# Patient Record
Sex: FEMALE | Race: WHITE | NOT HISPANIC OR LATINO | Employment: FULL TIME | URBAN - METROPOLITAN AREA
[De-identification: names, ages, dates, MRNs, and addresses within clinical notes are randomized per-mention and may not be internally consistent; named-entity substitution may affect disease eponyms.]

---

## 2017-07-19 ENCOUNTER — TRANSCRIBE ORDERS (OUTPATIENT)
Dept: ADMINISTRATIVE | Facility: HOSPITAL | Age: 57
End: 2017-07-19

## 2017-07-19 DIAGNOSIS — Z12.31 VISIT FOR SCREENING MAMMOGRAM: Primary | ICD-10-CM

## 2017-07-26 ENCOUNTER — OFFICE VISIT (OUTPATIENT)
Dept: INTERNAL MEDICINE | Facility: CLINIC | Age: 57
End: 2017-07-26

## 2017-07-26 VITALS
DIASTOLIC BLOOD PRESSURE: 78 MMHG | HEART RATE: 72 BPM | WEIGHT: 178 LBS | TEMPERATURE: 98.6 F | RESPIRATION RATE: 16 BRPM | BODY MASS INDEX: 26.98 KG/M2 | HEIGHT: 68 IN | SYSTOLIC BLOOD PRESSURE: 124 MMHG

## 2017-07-26 DIAGNOSIS — Z13.6 SCREENING FOR CARDIOVASCULAR CONDITION: ICD-10-CM

## 2017-07-26 DIAGNOSIS — E55.9 VITAMIN D DEFICIENCY: ICD-10-CM

## 2017-07-26 DIAGNOSIS — Z00.00 ENCOUNTER FOR HEALTH MAINTENANCE EXAMINATION IN ADULT: Primary | ICD-10-CM

## 2017-07-26 DIAGNOSIS — K21.9 GASTROESOPHAGEAL REFLUX DISEASE WITHOUT ESOPHAGITIS: ICD-10-CM

## 2017-07-26 DIAGNOSIS — R41.840 ATTENTION OR CONCENTRATION DEFICIT: ICD-10-CM

## 2017-07-26 DIAGNOSIS — Z79.899 HIGH RISK MEDICATION USE: ICD-10-CM

## 2017-07-26 DIAGNOSIS — Z01.419 ENCOUNTER FOR CERVICAL PAP SMEAR WITH PELVIC EXAM: ICD-10-CM

## 2017-07-26 DIAGNOSIS — Z11.59 NEED FOR HEPATITIS C SCREENING TEST: ICD-10-CM

## 2017-07-26 DIAGNOSIS — R12 HEARTBURN: ICD-10-CM

## 2017-07-26 DIAGNOSIS — F32.89 OTHER DEPRESSION: ICD-10-CM

## 2017-07-26 DIAGNOSIS — R13.10 DYSPHAGIA, UNSPECIFIED TYPE: ICD-10-CM

## 2017-07-26 DIAGNOSIS — Z23 NEED FOR TETANUS BOOSTER: ICD-10-CM

## 2017-07-26 DIAGNOSIS — M85.80 OSTEOPENIA: ICD-10-CM

## 2017-07-26 PROBLEM — H35.30 MACULAR DEGENERATION: Status: ACTIVE | Noted: 2017-07-26

## 2017-07-26 PROBLEM — F32.A DEPRESSION: Status: ACTIVE | Noted: 2017-07-26

## 2017-07-26 PROBLEM — Z78.0 MENOPAUSE PRESENT: Status: ACTIVE | Noted: 2017-07-26

## 2017-07-26 PROBLEM — C44.91 BASAL CELL CARCINOMA OF SKIN: Status: ACTIVE | Noted: 2017-07-26

## 2017-07-26 PROBLEM — I49.9 IRREGULAR HEART RHYTHM: Status: ACTIVE | Noted: 2017-07-26

## 2017-07-26 PROCEDURE — 90714 TD VACC NO PRESV 7 YRS+ IM: CPT | Performed by: INTERNAL MEDICINE

## 2017-07-26 PROCEDURE — 90471 IMMUNIZATION ADMIN: CPT | Performed by: INTERNAL MEDICINE

## 2017-07-26 PROCEDURE — 99213 OFFICE O/P EST LOW 20 MIN: CPT | Performed by: INTERNAL MEDICINE

## 2017-07-26 PROCEDURE — 99396 PREV VISIT EST AGE 40-64: CPT | Performed by: INTERNAL MEDICINE

## 2017-07-26 RX ORDER — ESCITALOPRAM OXALATE 10 MG/1
10 TABLET ORAL DAILY
Qty: 30 TABLET | Refills: 11 | Status: SHIPPED | OUTPATIENT
Start: 2017-07-26 | End: 2018-06-03 | Stop reason: SDUPTHER

## 2017-07-26 RX ORDER — OMEPRAZOLE 20 MG/1
20 CAPSULE, DELAYED RELEASE ORAL AS NEEDED
COMMUNITY
Start: 2014-11-26

## 2017-07-26 RX ORDER — METHYLPHENIDATE HYDROCHLORIDE 36 MG/1
36 TABLET ORAL DAILY
Qty: 30 TABLET | Refills: 0 | Status: SHIPPED | OUTPATIENT
Start: 2017-07-26 | End: 2017-08-25 | Stop reason: SDUPTHER

## 2017-07-26 RX ORDER — TRAZODONE HYDROCHLORIDE 50 MG/1
50 TABLET ORAL NIGHTLY
COMMUNITY
End: 2017-09-21 | Stop reason: SDUPTHER

## 2017-07-26 NOTE — PROGRESS NOTES
Subjective      History of Present Illness    The patient is re-establishing care.  She was last seen in March 2016.  She moved back from [place] a few weeks ago.    History obtained from the patient.    Gastroesophageal Reflux Disease Follow-up: The patient is being seen for a routine clinic follow-up of Gastroesophageal Reflux disease.   Symptoms:  She gets heartburn daily (feels it in her back).  Has new onset dysphagia.  No abdominal pain, no acid regurgitation, no nausea, no vomiting, no sore throat,  no odynophagia, no hematemesis, and no melena     Associated symptoms:  no anorexia, no early satiety, no bloating, no belching, no weight loss, no hoarseness, no cough and no wheezing.   Current treatment includes Omeprazole daily.     Osteopenia Follow-up: The patient is being seen for routine follow-up of Osteopenia.   The last DEXA scan was done 11/27/13.   Current treatment includes a Multivitamin daily, but no additional Calcium and Vitamin D.  Symptoms:  She reports no inability to stand straight, no loss of balance, no difficulty ambulating, no difficulty with stairs, no difficulty with posture, no change in clothing fit,  no arthralgias, no myalgias, no low back pain, no hip pain, no wrist pain, no neck pain and no new fracture.   The disease type is likely primary/postmenopausal osteoporosis.     ADD (Follow-Up): The patient's ADD  has been well controlled since the last visit.   Interval Events:  The patient states she had to have a full psychiatric evaluation to have her ADD medications prescribed in [place].  The psychiatrist put her on brand name Concerta due to her palpitations with the generics.   Target Symptoms:   1. Hyperactive behavior is denied.   2. Impulsive behavior is denied.   3. Difficulty concentrating is denied.   Medications: Concerta (last dose this am).  Medication side effects include no anorexia, no headache, no tics, no irritability, no problems sleeping, no weight loss, no  abdominal pain, no nausea, and no suicidal ideation.     Depression (Follow-Up): The patient states her Depression has been stable since the last visit.   Comorbid Illnesses:  Insomnia.  Interval Events: She is  again and very happy.   The psychiatrist in [place] started her on Trazodone for sleep, which helps.  Interval Symptoms: Stable depression and insomnia.   Denies loss of interest or pleasure in activities, denies insomnia, denies excessive sleepiness, denies inability to perform normal activities, denies loss of energy, denies feelings of worthlessness, denies feelings of guilt, denies trouble concentrating, and denies anxiety. The patient denies thoughts of suicide.   Social Support: the patient has good social support.   Medications:  Medication(s): Escitalopram.   The patient is adherent with her medication regimen. She denies medication side effects.             Jamee Wagner is a 56 y.o. female who presents for an Annual Physical.      The patient presents for a Pap Smear. She is G [1 ] P [2 ]. She is menopausal. Her last menstrual cycle was at age 47. She has not had any HRT. She [denies] dyspareunia. She [is] sexually active. Her last Pap smear was [ 10/10/13, normal except inflammation ]. She [denies] abnormal Pap Smears in the past. She [denies] sexually-transmitted diseases and HPV in the past. She [denies] KYLIE Exposure in utero.     She [does] do self breast exam. She [denies) breast mass, breast pain, or nipple discharge. Last mammogram was [5/5/16,  cat 2 ]. Last DEXA Scan was [ 11/27/13, osteopenia, hip and spine ]. Last Colonoscopy was [7/25/14, normal ].    She [takes] a daily Multiviatmin and [takes] daily Calcium / Vitamin D.     Her PGM had Colon Cancer.  She [denies] a family history of  Colon Polyps, Breast Cancer, Uterine Cancer, Cervical Cancer, or Ovarian Cancer.        PMH, PSH, SocHx, FamHx, Allergies, and Medications: Reviewed and updated.    Outpatient Medications Prior  to Visit   Medication Sig Dispense Refill   • escitalopram (LEXAPRO) 10 MG tablet Take 1 tablet by mouth daily. 90 tablet 1   • methylphenidate (CONCERTA) 36 MG CR tablet Take 1 tablet by mouth daily. 30 tablet 0     No facility-administered medications prior to visit.      Immunization History   Administered Date(s) Administered   • Pneumococcal Conjugate 13-Valent 11/18/2015   • Tdap 01/22/2007   • Zoster 11/18/2015         Patient Active Problem List   Diagnosis   • Macular degeneration   • Attention or concentration deficit   • Basal cell carcinoma of skin   • Depression   • Gastroesophageal reflux disease   • Menopause present   • Osteopenia   • Vitamin D deficiency       Health Habits:  Dental Exam. up to date  Eye Exam. up to date  Hearing Loss:  No  Exercise: 7 times/week.  Current exercise activities include: walks the dog  Diet: Healthy  Multivitamin: Yes    Safe Driving:  Yes  Seat Belt:  Yes  Bike Helmet:  N/A  Skin Screening:  Yes  Sunscreen: Yes  SBE / HALEIGH: Yes  Sexual Activity:  Yes  Birth Control:  Menopause  STD Prevention:  N/A    Last Pap: 10/10/13, normal except inflammation  Last Mammogram:  5/5/16, cat 2  Last DEXA Scan: 11/27/13, osteopenia, hip and spine  Last Colonoscopy: 7/25/14, normal  Last PSA: N/A    Social:    Social History     Social History   • Marital status:      Spouse name: N/A   • Number of children: 2   • Years of education: N/A     Occupational History   • Teacher, off work right now     Social History Main Topics   • Smoking status: Never Smoker   • Smokeless tobacco: Never Used   • Alcohol use Yes, 2-3 beers per week   • Drug use: No   • Sexual activity: Defer     Other Topics Concern   • Not on file     Social History Narrative   • No narrative on file         Current Medical Providers:    Jami Morales MD (Internal Medicine / Pediatrics)    The [facility] providers who are involved in the care of this patient are listed above.         Review of Systems    Constitutional: Positive for unexpected weight change (up 16 pounds). Negative for chills, fatigue and fever.        No weight gain or weight loss.  No night sweats.  No generalized pain.   HENT: Negative for congestion, ear pain, hearing loss, nosebleeds, postnasal drip, rhinorrhea, sinus pressure, sneezing, sore throat, tinnitus and voice change.         Denies snoring.   Eyes: Negative for photophobia, pain, discharge, redness, itching and visual disturbance.   Respiratory: Negative for cough, chest tightness, shortness of breath, wheezing and stridor.         No orthopnea, dyspnea on exertion, or PND.  No chest congestion.   No  hemoptysis.   Cardiovascular: Negative for chest pain, palpitations and leg swelling.        No claudication or syncope.   Gastrointestinal: Negative for abdominal pain, blood in stool, constipation, diarrhea, nausea, rectal pain and vomiting.        Has heartburn and dysphagia No hematemesis or odynophagia.  No belching or bloating.  No melena.   Endocrine: Negative for cold intolerance, heat intolerance, polydipsia, polyphagia and polyuria.        No hair loss or dry skin.  No generalized weakness.  No hot flashes.   Genitourinary: Negative for difficulty urinating, dyspareunia, dysuria, flank pain, frequency, hematuria, pelvic pain, urgency, vaginal bleeding and vaginal discharge.        No nocturia, incomplete emptying, or incontinence.   Musculoskeletal: Negative for arthralgias, back pain, gait problem, joint swelling, myalgias, neck pain and neck stiffness.        No joint stiffness.   Skin: Negative for rash.        No new skin lesions or changes in skin lesions. No breast pain or masses.  No nipple discharge or nipple inversion.   Neurological: Negative for dizziness, tremors, syncope, speech difficulty, weakness, light-headedness, numbness and headaches.        No tingling.  No memory loss.  No decreased concentration.   Hematological: Negative for adenopathy. Does not  bruise/bleed easily.   Psychiatric/Behavioral: Positive for sleep disturbance. Negative for confusion and suicidal ideas. The patient is not nervous/anxious.         Stable depression.           Objective     Vitals:    07/26/17 1208   BP: 124/78   BP Location: Right arm   Pulse: 72   Resp: 16   Temp: 98.6 °F (37 °C)   TempSrc: Temporal Artery    Weight: 178 lb (80.7 kg)       Body mass index is 27.47 kg/(m^2).    Physical Exam   Constitutional: She appears well-developed and well-nourished.   HENT:   Head: Normocephalic and atraumatic.   Right Ear: Tympanic membrane, external ear and ear canal normal.   Left Ear: Tympanic membrane, external ear and ear canal normal.   Mouth/Throat: Oropharynx is clear and moist. No oropharyngeal exudate.   Eyes: Conjunctivae and EOM are normal. Pupils are equal, round, and reactive to light.   Neck: Normal range of motion. Neck supple. No thyromegaly present.   Cardiovascular: Normal rate, regular rhythm and intact distal pulses.  Exam reveals no gallop and no friction rub.    No murmur heard.  Pulmonary/Chest: Effort normal and breath sounds normal. Right breast exhibits no inverted nipple, no mass, no nipple discharge, no skin change and no tenderness. Left breast exhibits no inverted nipple, no mass, no nipple discharge, no skin change and no tenderness.   Abdominal: Soft. Bowel sounds are normal. She exhibits no distension and no mass. There is no hepatomegaly. There is no tenderness. There is no rebound and no guarding.   Normal rectal tone. No rectal masses.   Genitourinary: Rectum normal, vagina normal and uterus normal. Uterus is not tender. Cervix exhibits no motion tenderness, no discharge and no friability. Right adnexum displays no mass and no tenderness. Left adnexum displays no mass and no tenderness. No vaginal discharge found.   Genitourinary Comments: Cervix is without lesions or erythema.  No uterine or adnexal masses or tenderness to palpation.   Musculoskeletal:  Normal range of motion. She exhibits no edema or tenderness.   Lymphadenopathy:     She has no cervical adenopathy.        Right cervical: No posterior cervical adenopathy present.       Left cervical: No posterior cervical adenopathy present.     She has no axillary adenopathy.        Right: No inguinal and no supraclavicular adenopathy present.        Left: No inguinal and no supraclavicular adenopathy present.   Neurological: She is alert. She has normal strength and normal reflexes. She displays normal reflexes. No cranial nerve deficit. She exhibits normal muscle tone. Coordination and gait normal.   Skin: No lesion and no rash noted.   No atypical skin lesions.   Psychiatric: She has a normal mood and affect.   Nursing note and vitals reviewed.          Assessment/Plan     Diagnoses and all orders for this visit:    Encounter for health maintenance examination in adult  -     Vitamin D 25 Hydroxy; Future  -     TSH; Future  -     CBC & Differential; Future  -     Magnesium; Future  -     Lipid Panel; Future  -     Comprehensive Metabolic Panel; Future  -     Hepatitis C Antibody; Future    Encounter for cervical Pap smear with pelvic exam  -     Liquid-based Pap Smear, Screening    Attention or concentration deficit  -     methylphenidate (CONCERTA) 36 MG CR tablet; Take 1 tablet by mouth Daily.    Gastroesophageal reflux disease without esophagitis  -     Ambulatory referral for Screening EGD    Heartburn  -     Ambulatory referral for Screening EGD    Dysphagia, unspecified type  -     Ambulatory referral for Screening EGD    Osteopenia  -     DEXA Bone Density Axial; Future    Vitamin D deficiency  -     Vitamin D 25 Hydroxy; Future    Other depression  -     escitalopram (LEXAPRO) 10 MG tablet; Take 1 tablet by mouth Daily.    Screening for cardiovascular condition  -     Vitamin D 25 Hydroxy; Future  -     TSH; Future  -     CBC & Differential; Future  -     Magnesium; Future  -     Lipid Panel; Future  -      Comprehensive Metabolic Panel; Future  -     Hepatitis C Antibody; Future    High risk medication use  -     Cancel: Urine Drug Screen  -     Magnesium; Future    Need for tetanus booster  -     Td (adult) Vaccine Not Adsorbed    Need for hepatitis C screening test  -     Hepatitis C Antibody; Future      The patient agrees to return for fasting labs.      Return in about 3 months (around 10/26/2017) for Recheck-ADD nonfasting.

## 2017-07-26 NOTE — PATIENT INSTRUCTIONS
The patient agrees to return for fasting labs.    Menopause is a normal process in which your reproductive ability comes to an end. This process happens gradually over a span of months to years, usually between the ages of 48 and 55. Menopause is complete when you have missed 12 consecutive menstrual periods.  It is important to talk with your health care provider about some of the most common conditions that affect postmenopausal women, such as heart disease, cancer, and bone loss (osteoporosis). Adopting a healthy lifestyle and getting preventive care can help to promote your health and wellness. Those actions can also lower your chances of developing some of these common conditions.  WHAT SHOULD I KNOW ABOUT MENOPAUSE?  During menopause, you may experience a number of symptoms, such as:  · Moderate-to-severe hot flashes.  · Night sweats.  · Decrease in sex drive.  · Mood swings.  · Headaches.  · Tiredness.  · Irritability.  · Memory problems.  · Insomnia.  Choosing to treat or not to treat menopausal changes is an individual decision that you make with your health care provider.  WHAT SHOULD I KNOW ABOUT HORMONE REPLACEMENT THERAPY AND SUPPLEMENTS?  Hormone therapy products are effective for treating symptoms that are associated with menopause, such as hot flashes and night sweats. Hormone replacement carries certain risks, especially as you become older. If you are thinking about using estrogen or estrogen with progestin treatments, discuss the benefits and risks with your health care provider.  WHAT SHOULD I KNOW ABOUT HEART DISEASE AND STROKE?  Heart disease, heart attack, and stroke become more likely as you age. This may be due, in part, to the hormonal changes that your body experiences during menopause. These can affect how your body processes dietary fats, triglycerides, and cholesterol. Heart attack and stroke are both medical emergencies.  There are many things that you can do to help prevent heart  disease and stroke:  · Have your blood pressure checked at least every 1-2 years. High blood pressure causes heart disease and increases the risk of stroke.  · If you are 55-79 years old, ask your health care provider if you should take aspirin to prevent a heart attack or a stroke.  · Do not use any tobacco products, including cigarettes, chewing tobacco, or electronic cigarettes. If you need help quitting, ask your health care provider.  · It is important to eat a healthy diet and maintain a healthy weight.    Be sure to include plenty of vegetables, fruits, low-fat dairy products, and lean protein.    Avoid eating foods that are high in solid fats, added sugars, or salt (sodium).  · Get regular exercise. This is one of the most important things that you can do for your health.    Try to exercise for at least 150 minutes each week. The type of exercise that you do should increase your heart rate and make you sweat. This is known as moderate-intensity exercise.    Try to do strengthening exercises at least twice each week. Do these in addition to the moderate-intensity exercise.  · Know your numbers. Ask your health care provider to check your cholesterol and your blood glucose. Continue to have your blood tested as directed by your health care provider.  WHAT SHOULD I KNOW ABOUT CANCER SCREENING?  There are several types of cancer. Take the following steps to reduce your risk and to catch any cancer development as early as possible.  Breast Cancer  · Practice breast self-awareness.    This means understanding how your breasts normally appear and feel.    It also means doing regular breast self-exams. Let your health care provider know about any changes, no matter how small.  · If you are 40 or older, have a clinician do a breast exam (clinical breast exam or CBE) every year. Depending on your age, family history, and medical history, it may be recommended that you also have a yearly breast X-ray (mammogram).  · If  you have a family history of breast cancer, talk with your health care provider about genetic screening.  · If you are at high risk for breast cancer, talk with your health care provider about having an MRI and a mammogram every year.  · Breast cancer (BRCA) gene test is recommended for women who have family members with BRCA-related cancers. Results of the assessment will determine the need for genetic counseling and BRCA1 and for BRCA2 testing. BRCA-related cancers include these types:    Breast. This occurs in males or females.    Ovarian.    Tubal. This may also be called fallopian tube cancer.    Cancer of the abdominal or pelvic lining (peritoneal cancer).    Prostate.    Pancreatic.  Cervical, Uterine, and Ovarian Cancer  Your health care provider may recommend that you be screened regularly for cancer of the pelvic organs. These include your ovaries, uterus, and vagina. This screening involves a pelvic exam, which includes checking for microscopic changes to the surface of your cervix (Pap test).  · For women ages 21-65, health care providers may recommend a pelvic exam and a Pap test every three years. For women ages 30-65, they may recommend the Pap test and pelvic exam, combined with testing for human papilloma virus (HPV), every five years. Some types of HPV increase your risk of cervical cancer. Testing for HPV may also be done on women of any age who have unclear Pap test results.  · Other health care providers may not recommend any screening for nonpregnant women who are considered low risk for pelvic cancer and have no symptoms. Ask your health care provider if a screening pelvic exam is right for you.  · If you have had past treatment for cervical cancer or a condition that could lead to cancer, you need Pap tests and screening for cancer for at least 20 years after your treatment. If Pap tests have been discontinued for you, your risk factors (such as having a new sexual partner) need to be  reassessed to determine if you should start having screenings again. Some women have medical problems that increase the chance of getting cervical cancer. In these cases, your health care provider may recommend that you have screening and Pap tests more often.  · If you have a family history of uterine cancer or ovarian cancer, talk with your health care provider about genetic screening.  · If you have vaginal bleeding after reaching menopause, tell your health care provider.  · There are currently no reliable tests available to screen for ovarian cancer.  Lung Cancer  Lung cancer screening is recommended for adults 55-80 years old who are at high risk for lung cancer because of a history of smoking. A yearly low-dose CT scan of the lungs is recommended if you:  · Currently smoke.  · Have a history of at least 30 pack-years of smoking and you currently smoke or have quit within the past 15 years. A pack-year is smoking an average of one pack of cigarettes per day for one year.  Yearly screening should:  · Continue until it has been 15 years since you quit.  · Stop if you develop a health problem that would prevent you from having lung cancer treatment.  Colorectal Cancer  · This type of cancer can be detected and can often be prevented.  · Routine colorectal cancer screening usually begins at age 50 and continues through age 75.  · If you have risk factors for colon cancer, your health care provider may recommend that you be screened at an earlier age.  · If you have a family history of colorectal cancer, talk with your health care provider about genetic screening.  · Your health care provider may also recommend using home test kits to check for hidden blood in your stool.  · A small camera at the end of a tube can be used to examine your colon directly (sigmoidoscopy or colonoscopy). This is done to check for the earliest forms of colorectal cancer.  · Direct examination of the colon should be repeated every 5-10  years until age 75. However, if early forms of precancerous polyps or small growths are found or if you have a family history or genetic risk for colorectal cancer, you may need to be screened more often.  Skin Cancer  · Check your skin from head to toe regularly.  · Monitor any moles. Be sure to tell your health care provider:    About any new moles or changes in moles, especially if there is a change in a mole's shape or color.    If you have a mole that is larger than the size of a pencil eraser.  · If any of your family members has a history of skin cancer, especially at a young age, talk with your health care provider about genetic screening.  · Always use sunscreen. Apply sunscreen liberally and repeatedly throughout the day.  · Whenever you are outside, protect yourself by wearing long sleeves, pants, a wide-brimmed hat, and sunglasses.  WHAT SHOULD I KNOW ABOUT OSTEOPOROSIS?  Osteoporosis is a condition in which bone destruction happens more quickly than new bone creation. After menopause, you may be at an increased risk for osteoporosis. To help prevent osteoporosis or the bone fractures that can happen because of osteoporosis, the following is recommended:  · If you are 19-50 years old, get at least 1,000 mg of calcium and at least 600 mg of vitamin D per day.  · If you are older than age 50 but younger than age 70, get at least 1,200 mg of calcium and at least 600 mg of vitamin D per day.  · If you are older than age 70, get at least 1,200 mg of calcium and at least 800 mg of vitamin D per day.  Smoking and excessive alcohol intake increase the risk of osteoporosis. Eat foods that are rich in calcium and vitamin D, and do weight-bearing exercises several times each week as directed by your health care provider.  WHAT SHOULD I KNOW ABOUT HOW MENOPAUSE AFFECTS MY MENTAL HEALTH?  Depression may occur at any age, but it is more common as you become older. Common symptoms of depression include:  · Low or sad  mood.  · Changes in sleep patterns.  · Changes in appetite or eating patterns.  · Feeling an overall lack of motivation or enjoyment of activities that you previously enjoyed.  · Frequent crying spells.  Talk with your health care provider if you think that you are experiencing depression.  WHAT SHOULD I KNOW ABOUT IMMUNIZATIONS?  It is important that you get and maintain your immunizations. These include:  · Tetanus, diphtheria, and pertussis (Tdap) booster vaccine.  · Influenza every year before the flu season begins.  · Pneumonia vaccine.  · Shingles vaccine.  Your health care provider may also recommend other immunizations.     This information is not intended to replace advice given to you by your health care provider. Make sure you discuss any questions you have with your health care provider.     Document Released: 02/09/2007 Document Revised: 01/08/2016 Document Reviewed: 08/20/2015  [employer] Interactive Patient Education ©2017 [employer]

## 2017-07-28 PROBLEM — I49.9 IRREGULAR HEART RHYTHM: Status: RESOLVED | Noted: 2017-07-26 | Resolved: 2017-07-28

## 2017-08-01 ENCOUNTER — LAB (OUTPATIENT)
Dept: INTERNAL MEDICINE | Facility: CLINIC | Age: 57
End: 2017-08-01

## 2017-08-01 DIAGNOSIS — Z13.6 SCREENING FOR CARDIOVASCULAR CONDITION: ICD-10-CM

## 2017-08-01 DIAGNOSIS — Z00.00 ENCOUNTER FOR HEALTH MAINTENANCE EXAMINATION IN ADULT: ICD-10-CM

## 2017-08-01 DIAGNOSIS — E55.9 VITAMIN D DEFICIENCY: ICD-10-CM

## 2017-08-01 DIAGNOSIS — Z79.899 HIGH RISK MEDICATION USE: ICD-10-CM

## 2017-08-01 DIAGNOSIS — Z11.59 NEED FOR HEPATITIS C SCREENING TEST: ICD-10-CM

## 2017-08-01 LAB
25(OH)D3 SERPL-MCNC: 29.6 NG/ML
ALBUMIN SERPL-MCNC: 4.5 G/DL (ref 3.2–4.8)
ALBUMIN/GLOB SERPL: 2 G/DL (ref 1.5–2.5)
ALP SERPL-CCNC: 95 U/L (ref 25–100)
ALT SERPL W P-5'-P-CCNC: 19 U/L (ref 7–40)
ANION GAP SERPL CALCULATED.3IONS-SCNC: 9 MMOL/L (ref 3–11)
ARTICHOKE IGE QN: 109 MG/DL (ref 0–130)
AST SERPL-CCNC: 17 U/L (ref 0–33)
BASOPHILS # BLD AUTO: 0.06 10*3/MM3 (ref 0–0.2)
BASOPHILS NFR BLD AUTO: 1.2 % (ref 0–1)
BILIRUB SERPL-MCNC: 0.6 MG/DL (ref 0.3–1.2)
BUN BLD-MCNC: 19 MG/DL (ref 9–23)
BUN/CREAT SERPL: 23.8 (ref 7–25)
CALCIUM SPEC-SCNC: 9.5 MG/DL (ref 8.7–10.4)
CHLORIDE SERPL-SCNC: 105 MMOL/L (ref 99–109)
CHOLEST SERPL-MCNC: 194 MG/DL (ref 0–200)
CO2 SERPL-SCNC: 25 MMOL/L (ref 20–31)
CREAT BLD-MCNC: 0.8 MG/DL (ref 0.6–1.3)
DEPRECATED RDW RBC AUTO: 42.4 FL (ref 37–54)
EOSINOPHIL # BLD AUTO: 0.11 10*3/MM3 (ref 0–0.3)
EOSINOPHIL NFR BLD AUTO: 2.2 % (ref 0–3)
ERYTHROCYTE [DISTWIDTH] IN BLOOD BY AUTOMATED COUNT: 11.9 % (ref 11.3–14.5)
GFR SERPL CREATININE-BSD FRML MDRD: 75 ML/MIN/1.73
GLOBULIN UR ELPH-MCNC: 2.2 GM/DL
GLUCOSE BLD-MCNC: 101 MG/DL (ref 70–100)
HCT VFR BLD AUTO: 40.9 % (ref 34.5–44)
HCV AB SER DONR QL: NORMAL
HDLC SERPL-MCNC: 70 MG/DL (ref 40–60)
HGB BLD-MCNC: 14.1 G/DL (ref 11.5–15.5)
IMM GRANULOCYTES # BLD: 0 10*3/MM3 (ref 0–0.03)
IMM GRANULOCYTES NFR BLD: 0 % (ref 0–0.6)
LYMPHOCYTES # BLD AUTO: 2.26 10*3/MM3 (ref 0.6–4.8)
LYMPHOCYTES NFR BLD AUTO: 45.9 % (ref 24–44)
MAGNESIUM SERPL-MCNC: 2.2 MG/DL (ref 1.3–2.7)
MCH RBC QN AUTO: 33.7 PG (ref 27–31)
MCHC RBC AUTO-ENTMCNC: 34.5 G/DL (ref 32–36)
MCV RBC AUTO: 97.6 FL (ref 80–99)
MONOCYTES # BLD AUTO: 0.37 10*3/MM3 (ref 0–1)
MONOCYTES NFR BLD AUTO: 7.5 % (ref 0–12)
NEUTROPHILS # BLD AUTO: 2.12 10*3/MM3 (ref 1.5–8.3)
NEUTROPHILS NFR BLD AUTO: 43.2 % (ref 41–71)
PLATELET # BLD AUTO: 268 10*3/MM3 (ref 150–450)
PMV BLD AUTO: 10.2 FL (ref 6–12)
POTASSIUM BLD-SCNC: 4.2 MMOL/L (ref 3.5–5.5)
PROT SERPL-MCNC: 6.7 G/DL (ref 5.7–8.2)
RBC # BLD AUTO: 4.19 10*6/MM3 (ref 3.89–5.14)
SODIUM BLD-SCNC: 139 MMOL/L (ref 132–146)
TRIGL SERPL-MCNC: 114 MG/DL (ref 0–150)
TSH SERPL DL<=0.05 MIU/L-ACNC: 1.33 MIU/ML (ref 0.35–5.35)
WBC NRBC COR # BLD: 4.92 10*3/MM3 (ref 3.5–10.8)

## 2017-08-01 PROCEDURE — 82306 VITAMIN D 25 HYDROXY: CPT | Performed by: INTERNAL MEDICINE

## 2017-08-01 PROCEDURE — 80053 COMPREHEN METABOLIC PANEL: CPT | Performed by: INTERNAL MEDICINE

## 2017-08-01 PROCEDURE — 36415 COLL VENOUS BLD VENIPUNCTURE: CPT | Performed by: INTERNAL MEDICINE

## 2017-08-01 PROCEDURE — 85025 COMPLETE CBC W/AUTO DIFF WBC: CPT | Performed by: INTERNAL MEDICINE

## 2017-08-01 PROCEDURE — 84443 ASSAY THYROID STIM HORMONE: CPT | Performed by: INTERNAL MEDICINE

## 2017-08-01 PROCEDURE — 83735 ASSAY OF MAGNESIUM: CPT | Performed by: INTERNAL MEDICINE

## 2017-08-01 PROCEDURE — 86803 HEPATITIS C AB TEST: CPT | Performed by: INTERNAL MEDICINE

## 2017-08-01 PROCEDURE — 80061 LIPID PANEL: CPT | Performed by: INTERNAL MEDICINE

## 2017-08-08 ENCOUNTER — APPOINTMENT (OUTPATIENT)
Dept: MAMMOGRAPHY | Facility: HOSPITAL | Age: 57
End: 2017-08-08
Attending: INTERNAL MEDICINE

## 2017-08-17 ENCOUNTER — HOSPITAL ENCOUNTER (OUTPATIENT)
Dept: BONE DENSITY | Facility: HOSPITAL | Age: 57
Discharge: HOME | End: 2017-08-17
Attending: INTERNAL MEDICINE | Admitting: INTERNAL MEDICINE

## 2017-08-17 ENCOUNTER — HOSPITAL ENCOUNTER (OUTPATIENT)
Dept: MAMMOGRAPHY | Facility: HOSPITAL | Age: 57
Discharge: HOME | End: 2017-08-17
Attending: INTERNAL MEDICINE

## 2017-08-17 DIAGNOSIS — M85.80 OSTEOPENIA: ICD-10-CM

## 2017-08-17 DIAGNOSIS — Z12.31 VISIT FOR SCREENING MAMMOGRAM: ICD-10-CM

## 2017-08-17 PROCEDURE — 77063 BREAST TOMOSYNTHESIS BI: CPT

## 2017-08-17 PROCEDURE — 77080 DXA BONE DENSITY AXIAL: CPT

## 2017-08-17 PROCEDURE — 77063 BREAST TOMOSYNTHESIS BI: CPT | Performed by: RADIOLOGY

## 2017-08-17 PROCEDURE — G0202 SCR MAMMO BI INCL CAD: HCPCS

## 2017-08-17 PROCEDURE — 77067 SCR MAMMO BI INCL CAD: CPT | Performed by: RADIOLOGY

## 2017-08-25 ENCOUNTER — TELEPHONE (OUTPATIENT)
Dept: INTERNAL MEDICINE | Facility: CLINIC | Age: 57
End: 2017-08-25

## 2017-08-25 DIAGNOSIS — R41.840 ATTENTION OR CONCENTRATION DEFICIT: ICD-10-CM

## 2017-08-25 RX ORDER — METHYLPHENIDATE HYDROCHLORIDE 36 MG/1
36 TABLET ORAL DAILY
Qty: 30 TABLET | Refills: 0 | Status: SHIPPED | OUTPATIENT
Start: 2017-08-25 | End: 2017-09-28 | Stop reason: SDUPTHER

## 2017-08-25 NOTE — TELEPHONE ENCOUNTER
----- Message from Jessica Stiles sent at 8/25/2017  9:48 AM EDT -----  Contact: PATIENT  PATIENT NEEDS A REFILL ON CONCERTA. A GOOD CALL BACK NUMBER -[phone]. THANK YOU.

## 2017-09-11 ENCOUNTER — TELEPHONE (OUTPATIENT)
Dept: INTERNAL MEDICINE | Facility: CLINIC | Age: 57
End: 2017-09-11

## 2017-09-11 NOTE — TELEPHONE ENCOUNTER
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  The school does not have their own form to fill out.   She would like the letter mailed to her.

## 2017-09-11 NOTE — TELEPHONE ENCOUNTER
----- Message from Beth Bates sent at 9/11/2017 11:25 AM EDT -----  Patient called in regards to questions about her physical. Patient has requested to talk one on one with doctor, or even a nurse.     Call back number for patient- [phone]    Thank you.

## 2017-09-21 ENCOUNTER — TELEPHONE (OUTPATIENT)
Dept: INTERNAL MEDICINE | Facility: CLINIC | Age: 57
End: 2017-09-21

## 2017-09-21 RX ORDER — TRAZODONE HYDROCHLORIDE 50 MG/1
50 TABLET ORAL NIGHTLY
Qty: 60 TABLET | Refills: 11 | Status: SHIPPED | OUTPATIENT
Start: 2017-09-21 | End: 2018-10-12 | Stop reason: SDUPTHER

## 2017-09-21 NOTE — TELEPHONE ENCOUNTER
----- Message from Beth Bates sent at 9/21/2017  2:56 PM EDT -----  Patient called in regards to a prescription refill. Prescription needed is Trazodone 50MG     Pharmacy: [facility] on [address]     Patient call back: [phone]

## 2017-09-28 ENCOUNTER — TELEPHONE (OUTPATIENT)
Dept: INTERNAL MEDICINE | Facility: CLINIC | Age: 57
End: 2017-09-28

## 2017-09-28 DIAGNOSIS — R41.840 ATTENTION OR CONCENTRATION DEFICIT: ICD-10-CM

## 2017-09-28 RX ORDER — METHYLPHENIDATE HYDROCHLORIDE 36 MG/1
36 TABLET ORAL DAILY
Qty: 30 TABLET | Refills: 0 | Status: SHIPPED | OUTPATIENT
Start: 2017-09-28 | End: 2017-10-30 | Stop reason: SDUPTHER

## 2017-09-28 NOTE — TELEPHONE ENCOUNTER
----- Message from Ashley Klein sent at 9/28/2017  9:17 AM EDT -----    PT CALLED NEEDING A REFILL ON RX CONCERTA    SHE CAN BE REACHED [phone]

## 2017-10-30 ENCOUNTER — OFFICE VISIT (OUTPATIENT)
Dept: INTERNAL MEDICINE | Facility: CLINIC | Age: 57
End: 2017-10-30

## 2017-10-30 VITALS
SYSTOLIC BLOOD PRESSURE: 126 MMHG | DIASTOLIC BLOOD PRESSURE: 80 MMHG | HEART RATE: 76 BPM | WEIGHT: 178 LBS | BODY MASS INDEX: 27.06 KG/M2 | RESPIRATION RATE: 16 BRPM

## 2017-10-30 DIAGNOSIS — Z00.00 ROUTINE ADULT HEALTH MAINTENANCE: Primary | ICD-10-CM

## 2017-10-30 DIAGNOSIS — R41.840 ATTENTION OR CONCENTRATION DEFICIT: ICD-10-CM

## 2017-10-30 DIAGNOSIS — Z23 NEED FOR INFLUENZA VACCINATION: ICD-10-CM

## 2017-10-30 LAB
BILIRUB BLD-MCNC: NEGATIVE MG/DL
CLARITY, POC: CLEAR
COLOR UR: YELLOW
EXPIRATION DATE: NORMAL
GLUCOSE UR STRIP-MCNC: NEGATIVE MG/DL
KETONES UR QL: NEGATIVE
LEUKOCYTE EST, POC: NEGATIVE
Lab: NORMAL
NITRITE UR-MCNC: NEGATIVE MG/ML
PH UR: 6.5 [PH] (ref 5–8)
PROT UR STRIP-MCNC: NEGATIVE MG/DL
RBC # UR STRIP: NEGATIVE /UL
SP GR UR: 1.01 (ref 1–1.03)
UROBILINOGEN UR QL: NORMAL

## 2017-10-30 PROCEDURE — 81003 URINALYSIS AUTO W/O SCOPE: CPT | Performed by: INTERNAL MEDICINE

## 2017-10-30 PROCEDURE — 90471 IMMUNIZATION ADMIN: CPT | Performed by: INTERNAL MEDICINE

## 2017-10-30 PROCEDURE — 90686 IIV4 VACC NO PRSV 0.5 ML IM: CPT | Performed by: INTERNAL MEDICINE

## 2017-10-30 PROCEDURE — 99213 OFFICE O/P EST LOW 20 MIN: CPT | Performed by: INTERNAL MEDICINE

## 2017-10-30 RX ORDER — METHYLPHENIDATE HYDROCHLORIDE 36 MG/1
36 TABLET ORAL DAILY
Qty: 30 TABLET | Refills: 0 | Status: SHIPPED | OUTPATIENT
Start: 2017-10-30 | End: 2017-12-06 | Stop reason: SDUPTHER

## 2017-10-30 NOTE — PROGRESS NOTES
Subjective       Jamee Wagner is a 56 y.o. female.     Chief Complaint   Patient presents with   • ADD       History obtained from the patient.      History of Present Illness        ADD (Follow-Up): The patient's ADD  has been well controlled since the last visit.   Interval Events:  None.  Target Symptoms:   1. Hyperactive behavior is denied.   2. Impulsive behavior is denied.   3. Difficulty concentrating is denied.   Medications: Concerta   Medication side effects include no anorexia, no headache, no tics, no irritability, no problems sleeping, no weight loss, no abdominal pain, no nausea, and no suicidal ideation.     Current Outpatient Prescriptions on File Prior to Visit   Medication Sig Dispense Refill   • escitalopram (LEXAPRO) 10 MG tablet Take 1 tablet by mouth Daily. 30 tablet 11   • methylphenidate (CONCERTA) 36 MG CR tablet Take 1 tablet by mouth Daily. 30 tablet 0   • omeprazole (PRILOSEC) 20 MG capsule Take 20 mg by mouth As Needed.     • traZODone (DESYREL) 50 MG tablet Take 1 tablet by mouth Every Night. Take 1-2 tablets at night as needed for sleep 60 tablet 11     No current facility-administered medications on file prior to visit.          The following portions of the patient's history were reviewed and updated as appropriate: allergies, current medications, past family history, past medical history, past social history, past surgical history and problem list.    Review of Systems   Constitutional: Negative for appetite change, fatigue and unexpected weight change.   Respiratory: Negative for shortness of breath.    Cardiovascular: Negative for chest pain and palpitations.   Gastrointestinal: Negative for abdominal pain, diarrhea, nausea and vomiting.   Neurological: Negative for headaches.        No tics.  No memory loss.   Psychiatric/Behavioral: Negative for agitation, confusion, decreased concentration, sleep disturbance and suicidal ideas. The patient is not nervous/anxious.          No depression.         Objective       Blood pressure 126/80, pulse 76, resp. rate 16, weight 178 lb (80.7 kg).      Physical Exam   Constitutional: She appears well-developed and well-nourished.   Cardiovascular: Normal rate and normal heart sounds.    No murmur heard.  Pulmonary/Chest: Effort normal and breath sounds normal.   Neurological: She is alert.   Psychiatric: She has a normal mood and affect.   Nursing note and vitals reviewed.      Assessment / Plan:    Jamee was seen today for add.    Diagnoses and all orders for this visit:    Need for influenza vaccination  -     Flu Vaccine Quad PF 3YR+ (FLUARIX 4045-0298)    Attention or concentration deficit  -     methylphenidate (CONCERTA) 36 MG CR tablet; Take 1 tablet by mouth Daily.    Routine adult health maintenance  -     POCT urinalysis dipstick, automated    The patient was instructed in the side effects of the medication.  Risks of the potential for tolerance, dependence, and addiction were discussed.  The patient was instructed to take the lowest dosage of the medication, at the lowest frequency, and for the shortest period of time possible.  The patient was instructed not to receive controlled substances or narcotics from other doctors, and not to giveaway or sell the medication.    The patient was instructed to abstain from illicit drug use.  The patient was instructed to avoid alcohol while taking these medications.      Narcotics/controlled substance agreement, Long report, and Urine Drug Screen were updated today if needed.      Return in about 3 months (around 1/30/2018) for Recheck-ADD.

## 2017-12-06 ENCOUNTER — TELEPHONE (OUTPATIENT)
Dept: INTERNAL MEDICINE | Facility: CLINIC | Age: 57
End: 2017-12-06

## 2017-12-06 DIAGNOSIS — R41.840 ATTENTION OR CONCENTRATION DEFICIT: ICD-10-CM

## 2017-12-06 RX ORDER — METHYLPHENIDATE HYDROCHLORIDE 36 MG/1
36 TABLET ORAL DAILY
Qty: 30 TABLET | Refills: 0 | Status: SHIPPED | OUTPATIENT
Start: 2017-12-06 | End: 2018-01-08 | Stop reason: SDUPTHER

## 2017-12-06 NOTE — TELEPHONE ENCOUNTER
----- Message from Fadumo Freeman sent at 12/6/2017 11:29 AM EST -----  [phone]  REFILL ON [employer]  CALL PT WHEN READY FOR

## 2018-01-08 ENCOUNTER — TELEPHONE (OUTPATIENT)
Dept: INTERNAL MEDICINE | Facility: CLINIC | Age: 58
End: 2018-01-08

## 2018-01-08 DIAGNOSIS — R41.840 ATTENTION OR CONCENTRATION DEFICIT: ICD-10-CM

## 2018-01-08 RX ORDER — METHYLPHENIDATE HYDROCHLORIDE 36 MG/1
36 TABLET ORAL DAILY
Qty: 30 TABLET | Refills: 0 | Status: SHIPPED | OUTPATIENT
Start: 2018-01-08 | End: 2018-01-29 | Stop reason: SDUPTHER

## 2018-01-08 NOTE — TELEPHONE ENCOUNTER
----- Message from Beth Bates sent at 1/8/2018  8:20 AM EST -----  Patient called requesting a prescription refill pickup.     RX needed: Concerta    Call back for patient: [phone]

## 2018-01-29 ENCOUNTER — OFFICE VISIT (OUTPATIENT)
Dept: INTERNAL MEDICINE | Facility: CLINIC | Age: 58
End: 2018-01-29

## 2018-01-29 VITALS
TEMPERATURE: 97 F | HEART RATE: 72 BPM | WEIGHT: 177.38 LBS | DIASTOLIC BLOOD PRESSURE: 70 MMHG | BODY MASS INDEX: 26.97 KG/M2 | SYSTOLIC BLOOD PRESSURE: 140 MMHG | RESPIRATION RATE: 20 BRPM

## 2018-01-29 DIAGNOSIS — J06.9 UPPER RESPIRATORY TRACT INFECTION, UNSPECIFIED TYPE: ICD-10-CM

## 2018-01-29 DIAGNOSIS — R41.840 ATTENTION OR CONCENTRATION DEFICIT: Primary | ICD-10-CM

## 2018-01-29 DIAGNOSIS — H61.23 BILATERAL IMPACTED CERUMEN: ICD-10-CM

## 2018-01-29 PROCEDURE — 99214 OFFICE O/P EST MOD 30 MIN: CPT | Performed by: INTERNAL MEDICINE

## 2018-01-29 PROCEDURE — 69210 REMOVE IMPACTED EAR WAX UNI: CPT | Performed by: INTERNAL MEDICINE

## 2018-01-29 RX ORDER — METHYLPHENIDATE HYDROCHLORIDE 36 MG/1
36 TABLET ORAL DAILY
Qty: 30 TABLET | Refills: 0 | Status: SHIPPED | OUTPATIENT
Start: 2018-01-29 | End: 2018-03-06 | Stop reason: SDUPTHER

## 2018-01-29 NOTE — PROGRESS NOTES
Subjective       Jamee Wagner is a 57 y.o. female.     Chief Complaint   Patient presents with   • ADD     3 month follow up         History obtained from the patient.    ADD Follow-Up: The patient's ADD  has been well controlled since the last visit.   Interval Events:  None.  Target Symptoms:   1. Hyperactive behavior is denied.   2. Impulsive behavior is denied.   3. Difficulty concentrating is denied.   Medications: Concerta   Medication side effects include no anorexia, no headache, no tics, no irritability, no problems sleeping, no weight loss, no abdominal pain, no nausea, and no suicidal ideation.           URI    This is a new problem. Episode onset: 2-3 days ago. The problem has been unchanged. There has been no fever. Associated symptoms include congestion, coughing (dry) and ear pain (left). Pertinent negatives include no abdominal pain, chest pain, diarrhea, headaches, joint pain, joint swelling, nausea, neck pain, rash, rhinorrhea, sinus pain, sneezing, sore throat, swollen glands, vomiting or wheezing. Treatments tried: Benadryl. The treatment provided mild relief.        Current Outpatient Prescriptions on File Prior to Visit   Medication Sig Dispense Refill   • escitalopram (LEXAPRO) 10 MG tablet Take 1 tablet by mouth Daily. 30 tablet 11   • methylphenidate (CONCERTA) 36 MG CR tablet Take 1 tablet by mouth Daily 30 tablet 0   • omeprazole (PRILOSEC) 20 MG capsule Take 20 mg by mouth As Needed.     • traZODone (DESYREL) 50 MG tablet Take 1 tablet by mouth Every Night. Take 1-2 tablets at night as needed for sleep 60 tablet 11     No current facility-administered medications on file prior to visit.          The following portions of the patient's history were reviewed and updated as appropriate: allergies, current medications, past family history, past medical history, past social history, past surgical history and problem list.    Review of Systems   Constitutional: Negative for appetite  change, chills, fatigue and unexpected weight change.   HENT: Positive for congestion, ear pain (left) and postnasal drip. Negative for rhinorrhea, sinus pain, sinus pressure, sneezing, sore throat and voice change.    Eyes: Positive for discharge (last week). Negative for pain, redness and itching.   Respiratory: Positive for cough (dry). Negative for shortness of breath and wheezing.    Cardiovascular: Negative for chest pain and palpitations.   Gastrointestinal: Negative for abdominal pain, diarrhea, nausea and vomiting.   Musculoskeletal: Negative for arthralgias, joint pain, joint swelling, myalgias and neck pain.   Skin: Negative for rash.   Neurological: Negative for headaches.        No tics.  No memory loss.   Hematological: Negative for adenopathy.   Psychiatric/Behavioral: Negative for agitation, confusion, decreased concentration, sleep disturbance and suicidal ideas. The patient is not nervous/anxious.         No depression.         Objective       Blood pressure 140/70, pulse 72, temperature 97 °F (36.1 °C), temperature source Temporal Artery , resp. rate 20, weight 80.5 kg (177 lb 6 oz).      Physical Exam   Constitutional: She appears well-developed and well-nourished.   HENT:   Head: Normocephalic and atraumatic.   Right Ear: External ear normal.   Left Ear: External ear normal.   Mouth/Throat: Oropharynx is clear and moist and mucous membranes are normal. No oral lesions.   Tonsils normal.  No sinus tenderness to palpation.  Left ear completely occluded by cerumen and right ear partially occluded by cerumen.  After removal, TM's clear.   Eyes: Conjunctivae are normal.   Neck: Normal range of motion. Neck supple.   Cardiovascular: Normal rate and regular rhythm.    No murmur heard.  Pulmonary/Chest: Effort normal and breath sounds normal.   Lymphadenopathy:     She has no cervical adenopathy.   Skin: No rash noted.   Psychiatric: She has a normal mood and affect.   Nursing note and vitals  reviewed.     Counseling was given to patient for the following topics: instructions for management, patient and family education, impressions, risks and benefits of treatment options and importance of treatment compliance . Total time of the encounter was 20 minutes and 15 minutes was spend counseling.      Ear Cerumen Removal Instrumentation  Date/Time: 1/29/2018 6:01 PM  Performed by: MORGAN LOPEZ  Authorized by: MORGAN LOPEZ   Consent: Verbal consent obtained.  Risks and benefits: risks, benefits and alternatives were discussed  Consent given by: patient    Anesthesia:  Local Anesthetic: none  Ceruminolytics applied: Ceruminolytics applied prior to the procedure.  Location details: right ear and left ear  Procedure type comments: curette and irrigation    Sedation:  Patient sedated: no  Patient tolerance: Patient tolerated the procedure well with no immediate complications          Assessment / Plan:    Diagnoses and all orders for this visit:    Attention or concentration deficit  -     methylphenidate (CONCERTA) 36 MG CR tablet; Take 1 tablet by mouth Daily    Upper respiratory tract infection, unspecified type   Continue Benadryl as needed.    Bilateral impacted cerumen   -     Ear Cerumen Removal      The patient was instructed in the side effects of the medication.  Risks of the potential for tolerance, dependence, and addiction were discussed.  The patient was instructed to take the lowest dosage of the medication, at the lowest frequency, and for the shortest period of time possible.  The patient was instructed not to receive controlled substances or narcotics from other doctors, and not to giveaway or sell the medication.     The patient was instructed to abstain from illicit drug use.  The patient was instructed to avoid alcohol while taking these medications.       Narcotics/controlled substance agreement, Long report, and Urine Drug Screen were updated today if needed.      Return in about 3  months (around 4/29/2018) for Recheck-ADD.

## 2018-03-06 ENCOUNTER — TELEPHONE (OUTPATIENT)
Dept: INTERNAL MEDICINE | Facility: CLINIC | Age: 58
End: 2018-03-06

## 2018-03-06 DIAGNOSIS — R41.840 ATTENTION OR CONCENTRATION DEFICIT: ICD-10-CM

## 2018-03-06 RX ORDER — METHYLPHENIDATE HYDROCHLORIDE 36 MG/1
36 TABLET ORAL DAILY
Qty: 30 TABLET | Refills: 0 | Status: SHIPPED | OUTPATIENT
Start: 2018-03-06 | End: 2018-03-29 | Stop reason: SDUPTHER

## 2018-03-06 NOTE — TELEPHONE ENCOUNTER
----- Message from Zeina Pham sent at 3/6/2018 11:09 AM EST -----  Patient is needing a refill on methylphenidate (CONCERTA) 36 MG CR tablet.    Please call patient back: [phone]    Thank you.

## 2018-03-29 ENCOUNTER — TELEPHONE (OUTPATIENT)
Dept: INTERNAL MEDICINE | Facility: CLINIC | Age: 58
End: 2018-03-29

## 2018-03-29 DIAGNOSIS — R41.840 ATTENTION OR CONCENTRATION DEFICIT: ICD-10-CM

## 2018-03-29 RX ORDER — METHYLPHENIDATE HYDROCHLORIDE 36 MG/1
36 TABLET ORAL DAILY
Qty: 30 TABLET | Refills: 0 | Status: SHIPPED | OUTPATIENT
Start: 2018-03-29 | End: 2018-04-30 | Stop reason: SDUPTHER

## 2018-04-30 ENCOUNTER — OFFICE VISIT (OUTPATIENT)
Dept: INTERNAL MEDICINE | Facility: CLINIC | Age: 58
End: 2018-04-30

## 2018-04-30 VITALS
SYSTOLIC BLOOD PRESSURE: 130 MMHG | BODY MASS INDEX: 26 KG/M2 | RESPIRATION RATE: 20 BRPM | HEART RATE: 78 BPM | TEMPERATURE: 99.2 F | WEIGHT: 171 LBS | DIASTOLIC BLOOD PRESSURE: 80 MMHG

## 2018-04-30 DIAGNOSIS — R41.840 ATTENTION OR CONCENTRATION DEFICIT: ICD-10-CM

## 2018-04-30 PROCEDURE — 99213 OFFICE O/P EST LOW 20 MIN: CPT | Performed by: INTERNAL MEDICINE

## 2018-04-30 RX ORDER — METHYLPHENIDATE HYDROCHLORIDE 36 MG/1
36 TABLET ORAL DAILY
Qty: 30 TABLET | Refills: 0 | Status: SHIPPED | OUTPATIENT
Start: 2018-04-30 | End: 2018-06-01 | Stop reason: SDUPTHER

## 2018-04-30 NOTE — PROGRESS NOTES
Subjective       Jamee Wagner is a 57 y.o. female.     Chief Complaint   Patient presents with   • ADD     3 month follow up         History obtained from the patient.      History of Present Illness     ADD Follow-Up: The patient's ADD  has been well controlled since the last visit.   Interval Events:  None.  Target Symptoms:   1. Hyperactive behavior is denied.   2. Impulsive behavior is denied.   3. Difficulty concentrating is denied.   Medications: Concerta   Medication side effects include no anorexia, no headache, no tics, no irritability, no problems sleeping, no weight loss, no abdominal pain, no nausea, and no suicidal ideation.     Current Outpatient Prescriptions on File Prior to Visit   Medication Sig Dispense Refill   • escitalopram (LEXAPRO) 10 MG tablet Take 1 tablet by mouth Daily. 30 tablet 11   • omeprazole (PRILOSEC) 20 MG capsule Take 20 mg by mouth As Needed.     • traZODone (DESYREL) 50 MG tablet Take 1 tablet by mouth Every Night. Take 1-2 tablets at night as needed for sleep 60 tablet 11   • [DISCONTINUED] methylphenidate (CONCERTA) 36 MG CR tablet Take 1 tablet by mouth Daily 30 tablet 0     No current facility-administered medications on file prior to visit.        Current outpatient and discharge medications have been reconciled for the patient.  Jami Morales MD        The following portions of the patient's history were reviewed and updated as appropriate: allergies, current medications, past family history, past medical history, past social history, past surgical history and problem list.    Review of Systems   Constitutional: Negative for appetite change, fatigue and unexpected weight change.   Respiratory: Negative for shortness of breath.    Cardiovascular: Negative for chest pain and palpitations.   Gastrointestinal: Negative for abdominal pain, diarrhea, nausea and vomiting.   Neurological: Negative for headaches.        No tics.  No memory loss.   Psychiatric/Behavioral:  Negative for agitation, confusion, decreased concentration, sleep disturbance and suicidal ideas. The patient is not nervous/anxious.         No depression.         Objective       Blood pressure 130/80, pulse 78, temperature 99.2 °F (37.3 °C), temperature source Temporal Artery , resp. rate 20, weight 77.6 kg (171 lb).      Physical Exam   Constitutional: She appears well-developed and well-nourished.   Cardiovascular: Normal rate, regular rhythm and normal heart sounds.    No murmur heard.  Pulmonary/Chest: Effort normal and breath sounds normal.   Neurological: She is alert.   Psychiatric: She has a normal mood and affect.   Nursing note and vitals reviewed.    Counseling was given to patient for the following topics: instructions for management, patient and family education, impressions, risks and benefits of treatment options and importance of treatment compliance . Total time of the encounter was 15 minutes and 12 minutes was spend counseling.l    Assessment / Plan:  Jamee was seen today for add.    Diagnoses and all orders for this visit:    Attention or concentration deficit  -     methylphenidate (CONCERTA) 36 MG CR tablet; Take 1 tablet by mouth Daily        The patient was instructed in the side effects of the medication.  Risks of the potential for tolerance, dependence, and addiction were discussed.  The patient was instructed to take the lowest dosage of the medication, at the lowest frequency, and for the shortest period of time possible.  The patient was instructed not to receive controlled substances or narcotics from other doctors, and not to giveaway or sell the medication.     The patient was instructed to abstain from illicit drug use.  The patient was instructed to avoid alcohol while taking these medications.       Narcotics/controlled substance agreement, Long report, and Urine Drug Screen were updated today if needed.       Return in about 3 months (around 7/30/2018) for  Recheck-ADD.

## 2018-06-01 ENCOUNTER — TELEPHONE (OUTPATIENT)
Dept: INTERNAL MEDICINE | Facility: CLINIC | Age: 58
End: 2018-06-01

## 2018-06-01 DIAGNOSIS — R41.840 ATTENTION OR CONCENTRATION DEFICIT: ICD-10-CM

## 2018-06-01 RX ORDER — METHYLPHENIDATE HYDROCHLORIDE 36 MG/1
36 TABLET ORAL DAILY
Qty: 30 TABLET | Refills: 0 | Status: SHIPPED | OUTPATIENT
Start: 2018-06-01 | End: 2018-07-11 | Stop reason: SDUPTHER

## 2018-06-01 NOTE — TELEPHONE ENCOUNTER
----- Message from Anh Carreon V sent at 6/1/2018  9:58 AM EDT -----  PT CALLED NEEDING A REFILL ON methylphenidate (CONCERTA) 36 MG CR tablet    SHE CAN BE REACHED -[phone]

## 2018-06-03 DIAGNOSIS — F32.89 OTHER DEPRESSION: ICD-10-CM

## 2018-06-04 RX ORDER — ESCITALOPRAM OXALATE 10 MG/1
TABLET ORAL
Qty: 90 TABLET | Refills: 1 | Status: SHIPPED | OUTPATIENT
Start: 2018-06-04 | End: 2018-11-19 | Stop reason: SDUPTHER

## 2018-07-11 DIAGNOSIS — R41.840 ATTENTION OR CONCENTRATION DEFICIT: ICD-10-CM

## 2018-07-11 NOTE — TELEPHONE ENCOUNTER
----- Message from Zeina Pham sent at 7/11/2018 12:02 PM EDT -----  PATIENT IS NEEDING A REFILL ON methylphenidate (CONCERTA) 36 MG CR tablet.    [facility] - [place], [place] - [address] - [phone] PH - [phone] FX    PATIENT CALL BACK : [phone]    THANK YOU

## 2018-07-12 RX ORDER — METHYLPHENIDATE HYDROCHLORIDE 36 MG/1
36 TABLET ORAL DAILY
Qty: 30 TABLET | Refills: 0 | Status: SHIPPED | OUTPATIENT
Start: 2018-07-12 | End: 2018-08-14 | Stop reason: SDUPTHER

## 2018-07-30 ENCOUNTER — OFFICE VISIT (OUTPATIENT)
Dept: INTERNAL MEDICINE | Facility: CLINIC | Age: 58
End: 2018-07-30

## 2018-07-30 VITALS
SYSTOLIC BLOOD PRESSURE: 120 MMHG | DIASTOLIC BLOOD PRESSURE: 84 MMHG | TEMPERATURE: 97.4 F | WEIGHT: 170.13 LBS | HEART RATE: 72 BPM | BODY MASS INDEX: 25.87 KG/M2 | RESPIRATION RATE: 20 BRPM

## 2018-07-30 DIAGNOSIS — Z79.899 HIGH RISK MEDICATION USE: ICD-10-CM

## 2018-07-30 DIAGNOSIS — R41.840 ATTENTION OR CONCENTRATION DEFICIT: Primary | ICD-10-CM

## 2018-07-30 PROCEDURE — 99214 OFFICE O/P EST MOD 30 MIN: CPT | Performed by: INTERNAL MEDICINE

## 2018-07-30 NOTE — PROGRESS NOTES
Subjective       Jamee Wagner is a 57 y.o. female.     Chief Complaint   Patient presents with   • ADD     3 month follow up          History obtained from the patient.      History of Present Illness     ADD Follow-Up: The patient's ADD  has been well controlled since the last visit.   Interval Events:  None.  Target Symptoms:   1. Hyperactive behavior is denied.   2. Impulsive behavior is denied.   3. Difficulty concentrating is denied.   Medications: Concerta   Medication side effects include no anorexia, no headache, no tics, no irritability, no problems sleeping, no weight loss, no abdominal pain, no nausea, and no suicidal ideation.     The patient is taking the following controlled substance(s) on a long term (greater than three months) basis: Methylphenidate (Concerta/Ritalin).  She is taking controlled substances for other (ADD).  A history was obtained from the patient and the following were reviewed: family history, social history, psychiatric history, and substance abuse history.  A baseline assessment was performed and includes a controlled substance agreement, urine drug screen, KRISTY (within 12 months prior to today's encounter), and a physical exam, if appropriate, was performed within the past year.  It is medically appropriate to prescribe her the medication(s) above.  If applicable, prior treatment records were obtained and reviewed to justify long term prescribing of controlled substances.  The patient was educated on the following: Limited use of the medication, need to discontinue the medication when her condition resolves, proper storage and disposal of medication(s), and risks and dangers associated with controlled substances including tolerance, dependence, and addiction.  A written informed consent was obtained and includes objectives of treatment, further diagnostic testing if appropriate, and an exit strategy for discontinuing the medication on the condition resolves, if  appropriate.  In addition, if appropriate, the patient will be screened for other medical conditions that may impact the prescribing of controlled substances.  Previous treatments have been tried including trials of noncontrolled substances or lower doses of controlled substances.  The controlled medication(s) have been titrated to the level appropriate and necessary and the medication(s) are not causing unacceptable side effects.        Current Outpatient Prescriptions on File Prior to Visit   Medication Sig Dispense Refill   • escitalopram (LEXAPRO) 10 MG tablet TAKE ONE TABLET BY MOUTH DAILY 90 tablet 1   • methylphenidate (CONCERTA) 36 MG CR tablet Take 1 tablet by mouth Daily 30 tablet 0   • omeprazole (PRILOSEC) 20 MG capsule Take 20 mg by mouth As Needed.     • traZODone (DESYREL) 50 MG tablet Take 1 tablet by mouth Every Night. Take 1-2 tablets at night as needed for sleep 60 tablet 11     No current facility-administered medications on file prior to visit.        Current outpatient and discharge medications have been reconciled for the patient.  Reviewed by: Jami Morales MD        The following portions of the patient's history were reviewed and updated as appropriate: allergies, current medications, past family history, past medical history, past social history, past surgical history and problem list.    Review of Systems   Constitutional: Negative for appetite change, fatigue and unexpected weight change.   HENT:        She reports a lesion on her tongue for several moths.  It gets sore.  It has not changed.  She denies smoking or excess alcohol intake.   Respiratory: Negative for shortness of breath.    Cardiovascular: Negative for chest pain and palpitations.   Gastrointestinal: Negative for abdominal pain, diarrhea, nausea and vomiting.   Neurological: Negative for headaches.        No tics.  No memory loss.   Psychiatric/Behavioral: Negative for agitation, confusion, decreased concentration, sleep  disturbance and suicidal ideas. The patient is not nervous/anxious.         No depression.         Objective       Blood pressure 120/84, pulse 72, temperature 97.4 °F (36.3 °C), temperature source Temporal Artery , resp. rate 20, weight 77.2 kg (170 lb 2 oz).      Physical Exam   Constitutional: She appears well-developed and well-nourished.   HENT:   Erythematous ulcerative lesion on right mid tongue, without white lacy pattern.   Cardiovascular: Normal rate, regular rhythm and normal heart sounds.    No murmur heard.  Pulmonary/Chest: Effort normal.   Neurological: She is alert.   Psychiatric: She has a normal mood and affect.   Nursing note and vitals reviewed.      Assessment / Plan:  Jamee was seen today for add.    Diagnoses and all orders for this visit:    Attention or concentration deficit    High risk medication use  -     Urine Drug Screen - Urine, Clean Catch; Future         Recommend an ENT or Oral Surgery appointment for the oral lesion.  The patient opts to wait, but will see a dentist.      The patient was instructed in the side effects of the medication.  Risks of the potential for tolerance, dependence, and addiction were discussed.  The patient was instructed to take the lowest dosage of the medication, at the lowest frequency, and for the shortest period of time possible.  The patient was instructed not to receive controlled substances or narcotics from other doctors, and not to giveaway or sell the medication.     The patient was instructed to abstain from illicit drug use.  The patient was instructed to avoid alcohol while taking these medications.       Narcotics/controlled substance agreement, Long report, and Urine Drug Screen were updated today if needed.      Return in about 3 months (around 10/30/2018) for Annual physical, fasting.

## 2018-07-30 NOTE — PATIENT INSTRUCTIONS
Recommend an ENT or Oral Surgery appointment for the oral lesion.  The patient opts to wait, but will see a dentist.

## 2018-08-14 ENCOUNTER — TELEPHONE (OUTPATIENT)
Dept: INTERNAL MEDICINE | Facility: CLINIC | Age: 58
End: 2018-08-14

## 2018-08-14 DIAGNOSIS — R41.840 ATTENTION OR CONCENTRATION DEFICIT: ICD-10-CM

## 2018-08-14 RX ORDER — METHYLPHENIDATE HYDROCHLORIDE 36 MG/1
36 TABLET ORAL DAILY
Qty: 30 TABLET | Refills: 0 | Status: SHIPPED | OUTPATIENT
Start: 2018-08-14 | End: 2018-09-13 | Stop reason: SDUPTHER

## 2018-08-14 NOTE — TELEPHONE ENCOUNTER
----- Message from Fadumo Freeman sent at 8/14/2018 11:08 AM EDT -----  Pt [phone]  Refill on Concerta   [facility] [place]

## 2018-09-13 ENCOUNTER — TELEPHONE (OUTPATIENT)
Dept: INTERNAL MEDICINE | Facility: CLINIC | Age: 58
End: 2018-09-13

## 2018-09-13 DIAGNOSIS — R41.840 ATTENTION OR CONCENTRATION DEFICIT: ICD-10-CM

## 2018-09-13 RX ORDER — METHYLPHENIDATE HYDROCHLORIDE 36 MG/1
36 TABLET ORAL DAILY
Qty: 30 TABLET | Refills: 0 | Status: SHIPPED | OUTPATIENT
Start: 2018-09-13 | End: 2018-10-10 | Stop reason: SDUPTHER

## 2018-09-13 NOTE — TELEPHONE ENCOUNTER
----- Message from Melanie Walden MA sent at 9/13/2018  9:19 AM EDT -----  Contact: Pt  Pt needs refill on Concerta 36mg. Contact pt at [phone] when ready

## 2018-10-10 ENCOUNTER — TELEPHONE (OUTPATIENT)
Dept: INTERNAL MEDICINE | Facility: CLINIC | Age: 58
End: 2018-10-10

## 2018-10-10 DIAGNOSIS — R41.840 ATTENTION OR CONCENTRATION DEFICIT: ICD-10-CM

## 2018-10-10 RX ORDER — METHYLPHENIDATE HYDROCHLORIDE 36 MG/1
36 TABLET ORAL DAILY
Qty: 30 TABLET | Refills: 0 | Status: SHIPPED | OUTPATIENT
Start: 2018-10-10 | End: 2018-11-19 | Stop reason: SDUPTHER

## 2018-10-10 NOTE — TELEPHONE ENCOUNTER
----- Message from Fadumo Freeman sent at 10/10/2018 12:06 PM EDT -----  [phone]  REFILL ON [employer]   CALL PT WHEN READY FOR

## 2018-10-12 RX ORDER — TRAZODONE HYDROCHLORIDE 50 MG/1
TABLET ORAL
Qty: 60 TABLET | Refills: 10 | Status: SHIPPED | OUTPATIENT
Start: 2018-10-12 | End: 2019-10-02 | Stop reason: SDUPTHER

## 2018-11-19 ENCOUNTER — OFFICE VISIT (OUTPATIENT)
Dept: INTERNAL MEDICINE | Facility: CLINIC | Age: 58
End: 2018-11-19

## 2018-11-19 VITALS
SYSTOLIC BLOOD PRESSURE: 136 MMHG | DIASTOLIC BLOOD PRESSURE: 70 MMHG | BODY MASS INDEX: 26.3 KG/M2 | RESPIRATION RATE: 20 BRPM | WEIGHT: 173 LBS | TEMPERATURE: 97 F | HEART RATE: 72 BPM

## 2018-11-19 DIAGNOSIS — F32.89 OTHER DEPRESSION: ICD-10-CM

## 2018-11-19 DIAGNOSIS — Z23 NEED FOR IMMUNIZATION AGAINST INFLUENZA: ICD-10-CM

## 2018-11-19 DIAGNOSIS — R41.840 ATTENTION OR CONCENTRATION DEFICIT: Primary | ICD-10-CM

## 2018-11-19 PROCEDURE — 99213 OFFICE O/P EST LOW 20 MIN: CPT | Performed by: INTERNAL MEDICINE

## 2018-11-19 PROCEDURE — 90686 IIV4 VACC NO PRSV 0.5 ML IM: CPT | Performed by: INTERNAL MEDICINE

## 2018-11-19 PROCEDURE — 90471 IMMUNIZATION ADMIN: CPT | Performed by: INTERNAL MEDICINE

## 2018-11-19 RX ORDER — METHYLPHENIDATE HYDROCHLORIDE 36 MG/1
36 TABLET ORAL DAILY
Qty: 30 TABLET | Refills: 0 | Status: SHIPPED | OUTPATIENT
Start: 2018-11-19 | End: 2018-12-14 | Stop reason: SDUPTHER

## 2018-11-19 RX ORDER — ESCITALOPRAM OXALATE 10 MG/1
10 TABLET ORAL DAILY
Qty: 90 TABLET | Refills: 1 | Status: SHIPPED | OUTPATIENT
Start: 2018-11-19 | End: 2019-03-20 | Stop reason: SDUPTHER

## 2018-11-19 NOTE — PROGRESS NOTES
Subjective       Jamee Wagner is a 57 y.o. female.     Chief Complaint   Patient presents with   • ADD     3 month follow up         History obtained from the patient.      History of Present Illness     ADD Follow-Up: The patient's ADD  has been well controlled since the last visit.   Interval Events:  None.  Target Symptoms:   1. Hyperactive behavior is denied.   2. Impulsive behavior is denied.   3. Difficulty concentrating is stable.  Medications: Concerta   Medication side effects include no anorexia, no headache, no tics, no irritability, no problems sleeping, no weight loss, no abdominal pain, no nausea, and no suicidal ideation.      The patient is taking the following controlled substance(s) on a long term (greater than three months) basis: Methylphenidate (Concerta/Ritalin).  She is taking controlled substances for other (ADD).  A history was obtained from the patient and the following were reviewed: family history, social history, psychiatric history, and substance abuse history.  A baseline assessment was performed and includes a controlled substance agreement, urine drug screen, KRISTY (within 12 months prior to today's encounter), and a physical exam, if appropriate, was performed within the past year.  It is medically appropriate to prescribe her the medication(s) above.  If applicable, prior treatment records were obtained and reviewed to justify long term prescribing of controlled substances.  The patient was educated on the following: Limited use of the medication, need to discontinue the medication when her condition resolves, proper storage and disposal of medication(s), and risks and dangers associated with controlled substances including tolerance, dependence, and addiction.  A written informed consent was obtained and includes objectives of treatment, further diagnostic testing if appropriate, and an exit strategy for discontinuing the medication on the condition resolves, if appropriate.   In addition, if appropriate, the patient will be screened for other medical conditions that may impact the prescribing of controlled substances.  Previous treatments have been tried including trials of noncontrolled substances or lower doses of controlled substances.  The controlled medication(s) have been titrated to the level appropriate and necessary and the medication(s) are not causing unacceptable side effects.        The patient feels her Depression is overall worse.  She is on Lexapro 10 mg daily.    Current Outpatient Medications on File Prior to Visit   Medication Sig Dispense Refill   • omeprazole (PRILOSEC) 20 MG capsule Take 20 mg by mouth As Needed.     • traZODone (DESYREL) 50 MG tablet TAKE ONE TO TWO TABLETS BY MOUTH EVERY NIGHT AS NEEDED FOR SLEEP 60 tablet 10     No current facility-administered medications on file prior to visit.        Current outpatient and discharge medications have been reconciled for the patient.  Reviewed by: Jami Morales MD        The following portions of the patient's history were reviewed and updated as appropriate: allergies, current medications, past family history, past medical history, past social history, past surgical history and problem list.    Review of Systems   Constitutional: Negative for appetite change, fatigue and unexpected weight change.   Respiratory: Negative for shortness of breath.    Cardiovascular: Negative for chest pain and palpitations.   Gastrointestinal: Negative for abdominal pain, diarrhea, nausea and vomiting.   Neurological: Negative for headaches.        No tics.  No memory loss.   Psychiatric/Behavioral: Negative for agitation, confusion, decreased concentration, sleep disturbance and suicidal ideas. The patient is not nervous/anxious.         Worsened depression.         Objective       Blood pressure 136/70, pulse 72, temperature 97 °F (36.1 °C), temperature source Temporal, resp. rate 20, weight 78.5 kg (173 lb).      Physical  Exam   Constitutional: She appears well-developed and well-nourished.   Cardiovascular: Normal rate, regular rhythm and normal heart sounds.   No murmur heard.  Pulmonary/Chest: Effort normal and breath sounds normal.   Neurological: She is alert.   Psychiatric: She has a normal mood and affect.   Nursing note and vitals reviewed.    Counseling was given to patient for the following topics: importance of medication compliance, side effects of medications, stress increase in the patient's life, symptoms of anxiety and symptoms of depression . Total time of the encounter was 15 minutes and 12 minutes was spent counseling.        Assessment / Plan:  Jamee was seen today for add.    Diagnoses and all orders for this visit:    Attention or concentration deficit  -     methylphenidate (CONCERTA) 36 MG CR tablet; Take 1 tablet by mouth Daily    Other depression  -     escitalopram (LEXAPRO) 10 MG tablet; Take 1 tablet by mouth Daily.  The patient will increase dose to 15 mg x 1-2 weeks to see if depression symptoms are improved.    Need for immunization against influenza  -     Fluarix/Fluzone/Afluria Quad>6 Months      The patient was instructed in the side effects of the medication.  Risks of the potential for tolerance, dependence, and addiction were discussed.  The patient was instructed to take the lowest dosage of the medication, at the lowest frequency, and for the shortest period of time possible.  The patient was instructed not to receive controlled substances or narcotics from other doctors, and not to giveaway or sell the medication.     The patient was instructed to abstain from illicit drug use.  The patient was instructed to avoid alcohol while taking these medications.       Narcotics/controlled substance agreement, Long report, and Urine Drug Screen were updated today if needed.        Return in about 3 months (around 2/19/2019) for Recheck-ADD.

## 2018-12-14 DIAGNOSIS — R41.840 ATTENTION OR CONCENTRATION DEFICIT: ICD-10-CM

## 2018-12-14 RX ORDER — METHYLPHENIDATE HYDROCHLORIDE 36 MG/1
36 TABLET ORAL DAILY
Qty: 30 TABLET | Refills: 0 | Status: SHIPPED | OUTPATIENT
Start: 2018-12-14 | End: 2019-12-23

## 2018-12-14 NOTE — TELEPHONE ENCOUNTER
Jamee notified Rx in front office for    /she is going to sign new medical release so that Severiano - her son can  Rx

## 2019-03-20 DIAGNOSIS — F32.89 OTHER DEPRESSION: ICD-10-CM

## 2019-03-20 RX ORDER — ESCITALOPRAM OXALATE 10 MG/1
TABLET ORAL
Qty: 90 TABLET | Refills: 0 | Status: SHIPPED | OUTPATIENT
Start: 2019-03-20 | End: 2019-05-15 | Stop reason: SDUPTHER

## 2019-05-15 DIAGNOSIS — F32.89 OTHER DEPRESSION: ICD-10-CM

## 2019-05-15 RX ORDER — ESCITALOPRAM OXALATE 10 MG/1
TABLET ORAL
Qty: 90 TABLET | Refills: 0 | Status: SHIPPED | OUTPATIENT
Start: 2019-05-15 | End: 2019-06-15 | Stop reason: SDUPTHER

## 2019-06-15 DIAGNOSIS — F32.89 OTHER DEPRESSION: ICD-10-CM

## 2019-06-17 RX ORDER — ESCITALOPRAM OXALATE 10 MG/1
TABLET ORAL
Qty: 90 TABLET | Refills: 0 | Status: SHIPPED | OUTPATIENT
Start: 2019-06-17 | End: 2019-08-22 | Stop reason: SDUPTHER

## 2019-08-22 DIAGNOSIS — F32.89 OTHER DEPRESSION: ICD-10-CM

## 2019-08-22 NOTE — TELEPHONE ENCOUNTER
LYNN to return call  Office # given Last appt was 11/18/2018   She was due for appt 2/18/2019   Needs to schedule appt for any refill on medication

## 2019-08-26 RX ORDER — ESCITALOPRAM OXALATE 10 MG/1
TABLET ORAL
Qty: 30 TABLET | Refills: 0 | Status: SHIPPED | OUTPATIENT
Start: 2019-08-26 | End: 2019-10-02 | Stop reason: SDUPTHER

## 2019-09-18 DIAGNOSIS — F32.89 OTHER DEPRESSION: ICD-10-CM

## 2019-09-18 RX ORDER — ESCITALOPRAM OXALATE 10 MG/1
TABLET ORAL
Qty: 90 TABLET | Refills: 0 | OUTPATIENT
Start: 2019-09-18

## 2019-10-02 DIAGNOSIS — F32.89 OTHER DEPRESSION: ICD-10-CM

## 2019-10-02 RX ORDER — TRAZODONE HYDROCHLORIDE 50 MG/1
TABLET ORAL
Qty: 60 TABLET | Refills: 9 | Status: SHIPPED | OUTPATIENT
Start: 2019-10-02 | End: 2020-10-05

## 2019-10-02 RX ORDER — ESCITALOPRAM OXALATE 10 MG/1
TABLET ORAL
Qty: 30 TABLET | Refills: 0 | Status: SHIPPED | OUTPATIENT
Start: 2019-10-02 | End: 2020-01-21

## 2019-11-04 DIAGNOSIS — F32.89 OTHER DEPRESSION: ICD-10-CM

## 2019-11-04 RX ORDER — ESCITALOPRAM OXALATE 10 MG/1
TABLET ORAL
Qty: 30 TABLET | Refills: 0 | OUTPATIENT
Start: 2019-11-04

## 2019-12-23 ENCOUNTER — OFFICE VISIT (OUTPATIENT)
Dept: INTERNAL MEDICINE | Facility: CLINIC | Age: 59
End: 2019-12-23

## 2019-12-23 VITALS
DIASTOLIC BLOOD PRESSURE: 80 MMHG | SYSTOLIC BLOOD PRESSURE: 140 MMHG | TEMPERATURE: 97.6 F | HEART RATE: 72 BPM | WEIGHT: 181.38 LBS | BODY MASS INDEX: 27.49 KG/M2 | HEIGHT: 68 IN

## 2019-12-23 DIAGNOSIS — F41.1 GENERALIZED ANXIETY DISORDER: ICD-10-CM

## 2019-12-23 DIAGNOSIS — R41.840 ATTENTION OR CONCENTRATION DEFICIT: ICD-10-CM

## 2019-12-23 DIAGNOSIS — Z13.6 SCREENING FOR CARDIOVASCULAR CONDITION: ICD-10-CM

## 2019-12-23 DIAGNOSIS — E55.9 VITAMIN D DEFICIENCY: ICD-10-CM

## 2019-12-23 DIAGNOSIS — Z79.899 HIGH RISK MEDICATION USE: ICD-10-CM

## 2019-12-23 DIAGNOSIS — Z00.00 ENCOUNTER FOR HEALTH MAINTENANCE EXAMINATION IN ADULT: Primary | ICD-10-CM

## 2019-12-23 DIAGNOSIS — F32.89 OTHER DEPRESSION: ICD-10-CM

## 2019-12-23 DIAGNOSIS — K21.9 GASTROESOPHAGEAL REFLUX DISEASE, ESOPHAGITIS PRESENCE NOT SPECIFIED: ICD-10-CM

## 2019-12-23 DIAGNOSIS — Z01.419 ENCOUNTER FOR CERVICAL PAP SMEAR WITH PELVIC EXAM: ICD-10-CM

## 2019-12-23 DIAGNOSIS — M85.80 OSTEOPENIA, UNSPECIFIED LOCATION: ICD-10-CM

## 2019-12-23 DIAGNOSIS — Z23 NEED FOR IMMUNIZATION AGAINST INFLUENZA: ICD-10-CM

## 2019-12-23 PROCEDURE — 90686 IIV4 VACC NO PRSV 0.5 ML IM: CPT | Performed by: INTERNAL MEDICINE

## 2019-12-23 PROCEDURE — 99396 PREV VISIT EST AGE 40-64: CPT | Performed by: INTERNAL MEDICINE

## 2019-12-23 PROCEDURE — 90471 IMMUNIZATION ADMIN: CPT | Performed by: INTERNAL MEDICINE

## 2019-12-23 RX ORDER — MULTIPLE VITAMINS W/ MINERALS TAB 9MG-400MCG
1 TAB ORAL DAILY
COMMUNITY

## 2019-12-23 RX ORDER — METHYLPHENIDATE HYDROCHLORIDE 36 MG/1
36 TABLET ORAL EVERY MORNING
Qty: 30 TABLET | Refills: 0 | Status: SHIPPED | OUTPATIENT
Start: 2019-12-23 | End: 2020-01-24 | Stop reason: SDUPTHER

## 2019-12-23 NOTE — PATIENT INSTRUCTIONS
I recommend Shingrix (new Shingles vaccine) and Hepatitis A vaccination at the pharmacy.    Please schedule your Mammogram in [place].    Please call if you would like to have a Cardiac CT Scan scheduled (information given today).      Health Maintenance for Postmenopausal Women  Menopause is a normal process in which your reproductive ability comes to an end. This process happens gradually over a span of months to years, usually between the ages of 48 and 55. Menopause is complete when you have missed 12 consecutive menstrual periods.  It is important to talk with your health care provider about some of the most common conditions that affect postmenopausal women, such as heart disease, cancer, and bone loss (osteoporosis). Adopting a healthy lifestyle and getting preventive care can help to promote your health and wellness. Those actions can also lower your chances of developing some of these common conditions.  What should I know about menopause?  During menopause, you may experience a number of symptoms, such as:  · Moderate-to-severe hot flashes.  · Night sweats.  · Decrease in sex drive.  · Mood swings.  · Headaches.  · Tiredness.  · Irritability.  · Memory problems.  · Insomnia.  Choosing to treat or not to treat menopausal changes is an individual decision that you make with your health care provider.  What should I know about hormone replacement therapy and supplements?  Hormone therapy products are effective for treating symptoms that are associated with menopause, such as hot flashes and night sweats. Hormone replacement carries certain risks, especially as you become older. If you are thinking about using estrogen or estrogen with progestin treatments, discuss the benefits and risks with your health care provider.  What should I know about heart disease and stroke?  Heart disease, heart attack, and stroke become more likely as you age. This may be due, in part, to the hormonal changes that your body  experiences during menopause. These can affect how your body processes dietary fats, triglycerides, and cholesterol. Heart attack and stroke are both medical emergencies.  There are many things that you can do to help prevent heart disease and stroke:  · Have your blood pressure checked at least every 1-2 years. High blood pressure causes heart disease and increases the risk of stroke.  · If you are 55-79 years old, ask your health care provider if you should take aspirin to prevent a heart attack or a stroke.  · Do not use any tobacco products, including cigarettes, chewing tobacco, or electronic cigarettes. If you need help quitting, ask your health care provider.  · It is important to eat a healthy diet and maintain a healthy weight.  ? Be sure to include plenty of vegetables, fruits, low-fat dairy products, and lean protein.  ? Avoid eating foods that are high in solid fats, added sugars, or salt (sodium).  · Get regular exercise. This is one of the most important things that you can do for your health.  ? Try to exercise for at least 150 minutes each week. The type of exercise that you do should increase your heart rate and make you sweat. This is known as moderate-intensity exercise.  ? Try to do strengthening exercises at least twice each week. Do these in addition to the moderate-intensity exercise.  · Know your numbers. Ask your health care provider to check your cholesterol and your blood glucose. Continue to have your blood tested as directed by your health care provider.    What should I know about cancer screening?  There are several types of cancer. Take the following steps to reduce your risk and to catch any cancer development as early as possible.  Breast Cancer  · Practice breast self-awareness.  ? This means understanding how your breasts normally appear and feel.  ? It also means doing regular breast self-exams. Let your health care provider know about any changes, no matter how small.  · If you  are 40 or older, have a clinician do a breast exam (clinical breast exam or CBE) every year. Depending on your age, family history, and medical history, it may be recommended that you also have a yearly breast X-ray (mammogram).  · If you have a family history of breast cancer, talk with your health care provider about genetic screening.  · If you are at high risk for breast cancer, talk with your health care provider about having an MRI and a mammogram every year.  · Breast cancer (BRCA) gene test is recommended for women who have family members with BRCA-related cancers. Results of the assessment will determine the need for genetic counseling and BRCA1 and for BRCA2 testing. BRCA-related cancers include these types:  ? Breast. This occurs in males or females.  ? Ovarian.  ? Tubal. This may also be called fallopian tube cancer.  ? Cancer of the abdominal or pelvic lining (peritoneal cancer).  ? Prostate.  ? Pancreatic.  Cervical, Uterine, and Ovarian Cancer  Your health care provider may recommend that you be screened regularly for cancer of the pelvic organs. These include your ovaries, uterus, and vagina. This screening involves a pelvic exam, which includes checking for microscopic changes to the surface of your cervix (Pap test).  · For women ages 21-65, health care providers may recommend a pelvic exam and a Pap test every three years. For women ages 30-65, they may recommend the Pap test and pelvic exam, combined with testing for human papilloma virus (HPV), every five years. Some types of HPV increase your risk of cervical cancer. Testing for HPV may also be done on women of any age who have unclear Pap test results.  · Other health care providers may not recommend any screening for nonpregnant women who are considered low risk for pelvic cancer and have no symptoms. Ask your health care provider if a screening pelvic exam is right for you.  · If you have had past treatment for cervical cancer or a condition  that could lead to cancer, you need Pap tests and screening for cancer for at least 20 years after your treatment. If Pap tests have been discontinued for you, your risk factors (such as having a new sexual partner) need to be reassessed to determine if you should start having screenings again. Some women have medical problems that increase the chance of getting cervical cancer. In these cases, your health care provider may recommend that you have screening and Pap tests more often.  · If you have a family history of uterine cancer or ovarian cancer, talk with your health care provider about genetic screening.  · If you have vaginal bleeding after reaching menopause, tell your health care provider.  · There are currently no reliable tests available to screen for ovarian cancer.  Lung Cancer  Lung cancer screening is recommended for adults 55-80 years old who are at high risk for lung cancer because of a history of smoking. A yearly low-dose CT scan of the lungs is recommended if you:  · Currently smoke.  · Have a history of at least 30 pack-years of smoking and you currently smoke or have quit within the past 15 years. A pack-year is smoking an average of one pack of cigarettes per day for one year.  Yearly screening should:  · Continue until it has been 15 years since you quit.  · Stop if you develop a health problem that would prevent you from having lung cancer treatment.  Colorectal Cancer  · This type of cancer can be detected and can often be prevented.  · Routine colorectal cancer screening usually begins at age 50 and continues through age 75.  · If you have risk factors for colon cancer, your health care provider may recommend that you be screened at an earlier age.  · If you have a family history of colorectal cancer, talk with your health care provider about genetic screening.  · Your health care provider may also recommend using home test kits to check for hidden blood in your stool.  · A small camera at  the end of a tube can be used to examine your colon directly (sigmoidoscopy or colonoscopy). This is done to check for the earliest forms of colorectal cancer.  · Direct examination of the colon should be repeated every 5-10 years until age 75. However, if early forms of precancerous polyps or small growths are found or if you have a family history or genetic risk for colorectal cancer, you may need to be screened more often.  Skin Cancer  · Check your skin from head to toe regularly.  · Monitor any moles. Be sure to tell your health care provider:  ? About any new moles or changes in moles, especially if there is a change in a mole's shape or color.  ? If you have a mole that is larger than the size of a pencil eraser.  · If any of your family members has a history of skin cancer, especially at a young age, talk with your health care provider about genetic screening.  · Always use sunscreen. Apply sunscreen liberally and repeatedly throughout the day.  · Whenever you are outside, protect yourself by wearing long sleeves, pants, a wide-brimmed hat, and sunglasses.  What should I know about osteoporosis?  Osteoporosis is a condition in which bone destruction happens more quickly than new bone creation. After menopause, you may be at an increased risk for osteoporosis. To help prevent osteoporosis or the bone fractures that can happen because of osteoporosis, the following is recommended:  · If you are 19-50 years old, get at least 1,000 mg of calcium and at least 600 mg of vitamin D per day.  · If you are older than age 50 but younger than age 70, get at least 1,200 mg of calcium and at least 600 mg of vitamin D per day.  · If you are older than age 70, get at least 1,200 mg of calcium and at least 800 mg of vitamin D per day.  Smoking and excessive alcohol intake increase the risk of osteoporosis. Eat foods that are rich in calcium and vitamin D, and do weight-bearing exercises several times each week as directed by  your health care provider.  What should I know about how menopause affects my mental health?  Depression may occur at any age, but it is more common as you become older. Common symptoms of depression include:  · Low or sad mood.  · Changes in sleep patterns.  · Changes in appetite or eating patterns.  · Feeling an overall lack of motivation or enjoyment of activities that you previously enjoyed.  · Frequent crying spells.  Talk with your health care provider if you think that you are experiencing depression.  What should I know about immunizations?  It is important that you get and maintain your immunizations. These include:  · Tetanus, diphtheria, and pertussis (Tdap) booster vaccine.  · Influenza every year before the flu season begins.  · Pneumonia vaccine.  · Shingles vaccine.  Your health care provider may also recommend other immunizations.  This information is not intended to replace advice given to you by your health care provider. Make sure you discuss any questions you have with your health care provider.  Document Released: 02/09/2007 Document Revised: 07/07/2017 Document Reviewed: 09/20/2016  [employer] Interactive Patient Education © 2019 [employer]      Heart-Healthy Eating Plan  Many factors influence your heart (coronary) health, including eating and exercise habits. Coronary risk increases with abnormal blood fat (lipid) levels. Heart-healthy meal planning includes limiting unhealthy fats, increasing healthy fats, and making other diet and lifestyle changes.  What is my plan?  Your health care provider may recommend that you:  · Limit your fat intake to _________% or less of your total calories each day.  · Limit your saturated fat intake to _________% or less of your total calories each day.  · Limit the amount of cholesterol in your diet to less than _________ mg per day.  What are tips for following this plan?  Cooking  Cook foods using methods other than frying. Baking, boiling, grilling, and  broiling are all good options. Other ways to reduce fat include:  · Removing the skin from poultry.  · Removing all visible fats from meats.  · Steaming vegetables in water or broth.  Meal planning    · At meals, imagine dividing your plate into fourths:  ? Fill one-half of your plate with vegetables and green salads.  ? Fill one-fourth of your plate with whole grains.  ? Fill one-fourth of your plate with lean protein foods.  · Eat 4-5 servings of vegetables per day. One serving equals 1 cup raw or cooked vegetable, or 2 cups raw leafy greens.  · Eat 4-5 servings of fruit per day. One serving equals 1 medium whole fruit, ¼ cup dried fruit, ½ cup fresh, frozen, or canned fruit, or ½ cup 100% fruit juice.  · Eat more foods that contain soluble fiber. Examples include apples, broccoli, carrots, beans, peas, and barley. Aim to get 25-30 g of fiber per day.  · Increase your consumption of legumes, nuts, and seeds to 4-5 servings per week. One serving of dried beans or legumes equals ½ cup cooked, 1 serving of nuts is ¼ cup, and 1 serving of seeds equals 1 tablespoon.  Fats  · Choose healthy fats more often. Choose monounsaturated and polyunsaturated fats, such as olive and canola oils, flaxseeds, walnuts, almonds, and seeds.  · Eat more omega-3 fats. Choose salmon, mackerel, sardines, tuna, flaxseed oil, and ground flaxseeds. Aim to eat fish at least 2 times each week.  · Check food labels carefully to identify foods with trans fats or high amounts of saturated fat.  · Limit saturated fats. These are found in animal products, such as meats, butter, and cream. Plant sources of saturated fats include palm oil, palm kernel oil, and coconut oil.  · Avoid foods with partially hydrogenated oils in them. These contain trans fats. Examples are stick margarine, some tub margarines, cookies, crackers, and other baked goods.  · Avoid fried foods.  General information  · Eat more home-cooked food and less restaurant, buffet, and  fast food.  · Limit or avoid alcohol.  · Limit foods that are high in starch and sugar.  · Lose weight if you are overweight. Losing just 5-10% of your body weight can help your overall health and prevent diseases such as diabetes and heart disease.  · Monitor your salt (sodium) intake, especially if you have high blood pressure. Talk with your health care provider about your sodium intake.  · Try to incorporate more vegetarian meals weekly.  What foods can I eat?  Fruits  All fresh, canned (in natural juice), or frozen fruits.  Vegetables  Fresh or frozen vegetables (raw, steamed, roasted, or grilled). Green salads.  Grains  Most grains. Choose whole wheat and whole grains most of the time. Rice and pasta, including brown rice and pastas made with whole wheat.  Meats and other proteins  Lean, well-trimmed beef, veal, pork, and lamb. Chicken and turkey without skin. All fish and shellfish. Wild duck, rabbit, pheasant, and venison. Egg whites or low-cholesterol egg substitutes. Dried beans, peas, lentils, and tofu. Seeds and most nuts.  Dairy  Low-fat or nonfat cheeses, including ricotta and mozzarella. Skim or 1% milk (liquid, powdered, or evaporated). Buttermilk made with low-fat milk. Nonfat or low-fat yogurt.  Fats and oils  Non-hydrogenated (trans-free) margarines. Vegetable oils, including soybean, sesame, sunflower, olive, peanut, safflower, corn, canola, and cottonseed. Salad dressings or mayonnaise made with a vegetable oil.  Beverages  Water (mineral or sparkling). Coffee and tea. Diet carbonated beverages.  Sweets and desserts  Sherbet, gelatin, and fruit ice. Small amounts of dark chocolate.  Limit all sweets and desserts.  Seasonings and condiments  All seasonings and condiments.  The items listed above may not be a complete list of foods and beverages you can eat. Contact a dietitian for more options.  What foods are not recommended?  Fruits  Canned fruit in heavy syrup. Fruit in cream or butter sauce.  Fried fruit. Limit coconut.  Vegetables  Vegetables cooked in cheese, cream, or butter sauce. Fried vegetables.  Grains  Breads made with saturated or trans fats, oils, or whole milk. Croissants. Sweet rolls. Donuts. High-fat crackers, such as cheese crackers.  Meats and other proteins  Fatty meats, such as hot dogs, ribs, sausage, brizuela, rib-eye roast or steak. High-fat deli meats, such as salami and bologna. Caviar. Domestic duck and goose. Organ meats, such as liver.  Dairy  Cream, sour cream, cream cheese, and creamed cottage cheese. Whole milk cheeses. Whole or 2% milk (liquid, evaporated, or condensed). Whole buttermilk. Cream sauce or high-fat cheese sauce. Whole-milk yogurt.  Fats and oils  Meat fat, or shortening. Cocoa butter, hydrogenated oils, palm oil, coconut oil, palm kernel oil. Solid fats and shortenings, including brizuela fat, salt pork, lard, and butter. Nondairy cream substitutes. Salad dressings with cheese or sour cream.  Beverages  Regular sodas and any drinks with added sugar.  Sweets and desserts  Frosting. Pudding. Cookies. Cakes. Pies. Milk chocolate or white chocolate. Buttered syrups. Full-fat ice cream or ice cream drinks.  The items listed above may not be a complete list of foods and beverages to avoid. Contact a dietitian for more information.  Summary  · Heart-healthy meal planning includes limiting unhealthy fats, increasing healthy fats, and making other diet and lifestyle changes.  · Lose weight if you are overweight. Losing just 5-10% of your body weight can help your overall health and prevent diseases such as diabetes and heart disease.  · Focus on eating a balance of foods, including fruits and vegetables, low-fat or nonfat dairy, lean protein, nuts and legumes, whole grains, and heart-healthy oils and fats.  This information is not intended to replace advice given to you by your health care provider. Make sure you discuss any questions you have with your health care  provider.  Document Released: 09/26/2009 Document Revised: 01/25/2019 Document Reviewed: 01/25/2019  [employer] Interactive Patient Education © 2019 [employer]      Exercising to Stay Healthy  To become healthy and stay healthy, it is recommended that you do moderate-intensity and vigorous-intensity exercise. You can tell that you are exercising at a moderate intensity if your heart starts beating faster and you start breathing faster but can still hold a conversation. You can tell that you are exercising at a vigorous intensity if you are breathing much harder and faster and cannot hold a conversation while exercising.  Exercising regularly is important. It has many health benefits, such as:  · Improving overall fitness, flexibility, and endurance.  · Increasing bone density.  · Helping with weight control.  · Decreasing body fat.  · Increasing muscle strength.  · Reducing stress and tension.  · Improving overall health.  How often should I exercise?  Choose an activity that you enjoy, and set realistic goals. Your health care provider can help you make an activity plan that works for you.  Exercise regularly as told by your health care provider. This may include:  · Doing strength training two times a week, such as:  ? Lifting weights.  ? Using resistance bands.  ? Push-ups.  ? Sit-ups.  ? Yoga.  · Doing a certain intensity of exercise for a given amount of time. Choose from these options:  ? A total of 150 minutes of moderate-intensity exercise every week.  ? A total of 75 minutes of vigorous-intensity exercise every week.  ? A mix of moderate-intensity and vigorous-intensity exercise every week.  Children, pregnant women, people who have not exercised regularly, people who are overweight, and older adults may need to talk with a health care provider about what activities are safe to do. If you have a medical condition, be sure to talk with your health care provider before you start a new exercise program.  What  are some exercise ideas?  Moderate-intensity exercise ideas include:  · Walking 1 mile (1.6 km) in about 15 minutes.  · Biking.  · Hiking.  · Golfing.  · Dancing.  · Water aerobics.  Vigorous-intensity exercise ideas include:  · Walking 4.5 miles (7.2 km) or more in about 1 hour.  · Jogging or running 5 miles (8 km) in about 1 hour.  · Biking 10 miles (16.1 km) or more in about 1 hour.  · Lap swimming.  · Roller-skating or in-line skating.  · Cross-country skiing.  · Vigorous competitive sports, such as football, basketball, and soccer.  · Jumping rope.  · Aerobic dancing.  What are some everyday activities that can help me to get exercise?  · Yard work, such as:  ? Pushing a .  ? Raking and bagging leaves.  · Washing your car.  · Pushing a stroller.  · Shoveling snow.  · Gardening.  · Washing windows or floors.  How can I be more active in my day-to-day activities?  · Use stairs instead of an elevator.  · Take a walk during your lunch break.  · If you drive, park your car farther away from your work or school.  · If you take public transportation, get off one stop early and walk the rest of the way.  · Stand up or walk around during all of your indoor phone calls.  · Get up, stretch, and walk around every 30 minutes throughout the day.  · Enjoy exercise with a friend. Support to continue exercising will help you keep a regular routine of activity.  What guidelines can I follow while exercising?  · Before you start a new exercise program, talk with your health care provider.  · Do not exercise so much that you hurt yourself, feel dizzy, or get very short of breath.  · Wear comfortable clothes and wear shoes with good support.  · Drink plenty of water while you exercise to prevent dehydration or heat stroke.  · Work out until your breathing and your heartbeat get faster.  Where to find more information  · [place] Department of Health and Human Services: www.hhs.gov  · Centers for Disease Control and Prevention  (CDC): www.cdc.gov  Summary  · Exercising regularly is important. It will improve your overall fitness, flexibility, and endurance.  · Regular exercise also will improve your overall health. It can help you control your weight, reduce stress, and improve your bone density.  · Do not exercise so much that you hurt yourself, feel dizzy, or get very short of breath.  · Before you start a new exercise program, talk with your health care provider.  This information is not intended to replace advice given to you by your health care provider. Make sure you discuss any questions you have with your health care provider.  Document Released: 01/20/2012 Document Revised: 11/08/2018 Document Reviewed: 11/08/2018  [employer] Interactive Patient Education © 2019 [employer]

## 2019-12-23 NOTE — PROGRESS NOTES
Subjective     Chief Complaint:  Physical Exam.    History of Present Illness    History obtained from the patient.    The patient was last seen on 11/19/2018.  She has not had health insurance.  Last labs were done on 8/1/2017.    ADD Follow-Up: The patient's ADD  has been unstable.    Interval Events: The patient weaned herself off Concerta due to the expense.  She would like to re-start it.  Target Symptoms:   1. Hyperactive behavior is denied.   2. Impulsive behavior is worse.  3. Difficulty concentrating is  worse.  She is having some memory issues.  Medications: None.  Medication Side Effects: None.     The patient is taking the following controlled substance(s) on a long term (greater than three months) basis: Methylphenidate (Concerta/Ritalin).  She is taking controlled substances for other (ADD).  A history was obtained from the patient and the following were reviewed: family history, social history, psychiatric history, and substance abuse history.  A baseline assessment was performed and includes a controlled substance agreement, urine drug screen, KRISTY (within 12 months prior to today's encounter), and a physical exam, if appropriate, was performed within the past year.  It is medically appropriate to prescribe her the medication(s) above.  If applicable, prior treatment records were obtained and reviewed to justify long term prescribing of controlled substances.  The patient was educated on the following: Limited use of the medication, need to discontinue the medication when her condition resolves, proper storage and disposal of medication(s), and risks and dangers associated with controlled substances including tolerance, dependence, and addiction.  A written informed consent was obtained and includes objectives of treatment, further diagnostic testing if appropriate, and an exit strategy for discontinuing the medication on the condition resolves, if appropriate.  In addition, if  appropriate, the patient will be screened for other medical conditions that may impact the prescribing of controlled substances.  Previous treatments have been tried including trials of noncontrolled substances or lower doses of controlled substances.  The controlled medication(s) have been titrated to the level appropriate and necessary and the medication(s) are not causing unacceptable side effects.      Gastroesophageal Reflux Disease Follow-up: The patient is being seen for a routine clinic follow-up of Gastroesophageal Reflux Disease, which is stable.   Symptoms:   Stable heartburn and acid regurgitation.  No abdominal pain, nausea, vomiting, dysphagia, odynophagia, hematemesis, hematochezia, melena, early satiety, belching, and bloating.   Associated Symptoms:   No chronic sore throat, cough, hoarseness, or wheezing.   Medication: Omeprazole daily.      Osteopenia Follow-up: The patient is being seen for routine follow-up of Osteopenia, which is stable.   Interval Events: The last DEXA scan was done 8/17/17, Osteopenia L1-L4, left total hip, and bilateral femoral necks .   Symptoms:   Reports mild neck pain denies myalgias, arthralgias, back pain, hip pain, hand pain, paresthesias, loss of balance, and gait instability.    Associated Symptoms: No new fracture.   Medication: Calcium and Vitamin D supplements.    Vitamin D Deficiency Follow-Up: The patient is here for follow-up of Vitamin D Deficiency, which is stable.    Interval Events: Vitamin D level on 8/1/2017 was 29.6.    Symptoms: Denies fatigue, myalgias, arthralgias, paresthesias, loss of balance, and gait instability.    Medication: Vitamin D supplement.    Depression and Anxiety Disorder Follow-Up: The patient is being seen for follow-up of Depression and Anxiety, which is unstable.  Comorbid Illnesses:  Insomnia.  Interval Events:  She feels the Concerta was helping with her depression and anxiety more than she realized.  Symptoms: Worsened  depression and anxiety, but stable insomnia.    She is having issues with impulsivity, concentration, and memory.  She has some feelings of worthlessness and guilt, but not hopelessness.  She denies panic attacks and anhedonia.    Associated Symptoms: The patient denies thoughts of suicide.   Social Support: the patient has good social support.   Medications:  Medication(s): Escitalopram.   Side Effects: None.              Jamee Wagner is a 59 y.o. female who presents for an Annual Physical.      The patient presents for a Pap Smear. She is G [1 ] P [2 ]. She is menopausal. Her last menstrual cycle was at age 47. She has not had any HRT. She [denies] dyspareunia. She [is] sexually active. Her last Pap smear was [ 7/26/17,  normal ]. She [denies] abnormal Pap Smears in the past. She [denies] sexually-transmitted diseases and HPV in the past. She [denies] KYLIE Exposure in utero.      She [does] do self breast exam. She [denies) breast mass, breast pain, or nipple discharge. Last Mammogram was [8/17/17,  cat 1 ]. Last DEXA Scan was [ 11/17/17, Osteopenia L1-L4, left total hip, and bilateral femoral necks ]. Last Colonoscopy was [7/29/14, normal ].     She [takes] a daily Multiviatmin and [takes] daily Calcium / Vitamin D.      Her PGM had Colon Cancer.  She [denies] a family history of  Colon Polyps, Breast Cancer, Uterine Cancer, Cervical Cancer, or Ovarian Cancer.    PMH, PSH, SocHx, FamHx, Allergies, and Medications: Reviewed and updated.    Outpatient Medications Prior to Visit   Medication Sig Dispense Refill   • Cholecalciferol (VITAMIN D3 PO) Take 1,000 Units by mouth Daily.     • escitalopram (LEXAPRO) 10 MG tablet TAKE ONE TABLET BY MOUTH DAILY 30 tablet 0   • Multiple Vitamins-Minerals (MULTIVITAMIN WITH MINERALS) tablet tablet Take 1 tablet by mouth Daily.     • omeprazole (PRILOSEC) 20 MG capsule Take 20 mg by mouth As Needed.     • traZODone (DESYREL) 50 MG tablet TAKE ONE TO TWO TABLETS BY MOUTH EVERY  NIGHT AS NEEDED FOR SLEEP 60 tablet 9   • methylphenidate (CONCERTA) 36 MG CR tablet Take 1 tablet by mouth Daily Earliest Fill Date: 12/14/18 30 tablet 0     No facility-administered medications prior to visit.        Immunization History   Administered Date(s) Administered   • FLUARIX/FLUZONE/AFLURIA/FLULAVAL QUAD 11/19/2018   • Flu Vaccine Quad PF >36MO 10/30/2017   • Pneumococcal Conjugate 13-Valent (PCV13) 11/18/2015   • Td, Not Adsorbed 07/26/2017   • Tdap 01/22/2007   • Zostavax 11/18/2015         Patient Active Problem List   Diagnosis   • Macular degeneration   • Attention or concentration deficit   • Basal cell carcinoma of skin   • Depression   • Gastroesophageal reflux disease   • Menopause present   • Osteopenia   • Vitamin D deficiency       Health Habits:  Dental Exam. up to date  Eye Exam. up to date  Hearing Loss:  No  Exercise: 4 times/week.  Current exercise activities include: stationary bicycling/spin class and Resistance class  Diet: Healthy   Multivitamin: Yes    Safe Driving:  Yes  Seat Belt:  Yes  Bike Helmet:  No  Skin Screening:  Yes  Sunscreen: Yes  SBE / HALEIGH: Yes  Sexual Activity:  Yes  Birth Control:  Menopause  STD Prevention:  N/A    Last Pap: 7/26/2017, normal  Last Mammogram: 8/17/2017, category 1  Last DEXA Scan: 8/17/2017, Osteopenia L1-L4, left total hip, bilateral femoral neck.  Last Colonoscopy: 7/29/2014, normal  Last PSA: N/A    Social:    Social History     Socioeconomic History   • Marital status:      Spouse name: Not on file   • Number of children: 2   • Years of education: Not on file   • Highest education level: Not on file   Occupational History   • Occupation:  [employer]     Comment: full time   Tobacco Use   • Smoking status: Never Smoker   • Smokeless tobacco: Never Used   Substance and Sexual Activity   • Alcohol use: Yes     Comment: 2-3 beers per week   • Drug use: No   • Sexual activity: Yes     Partners: Male     Birth  control/protection: Post-menopausal         Current Medical Providers:    Jami Morales MD (Internal Medicine / Pediatrics)    The [facility] providers who are involved in the care of this patient are listed above.         Review of Systems   Constitutional: Positive for unexpected weight change (up 8 pounds). Negative for chills, fatigue and fever.        No night sweats.    HENT: Positive for postnasal drip. Negative for congestion, ear pain, hearing loss, nosebleeds, rhinorrhea, sinus pressure, sinus pain, sneezing, sore throat, tinnitus and voice change.         Reports mild snoring.   Eyes: Negative for photophobia, pain, discharge, redness, itching and visual disturbance.   Respiratory: Negative for apnea, cough, chest tightness, shortness of breath, wheezing and stridor.         No chest congestion.  No hemoptysis.   Cardiovascular: Negative for chest pain, palpitations and leg swelling.        No orthopnea, LAZARO, or PND.  No claudication or syncope.   Gastrointestinal: Negative for abdominal pain, blood in stool, constipation, diarrhea, nausea, rectal pain and vomiting.        Reports heartburn.  No melena.  No hematemesis.  No dysphagia or odynophagia.  No early satiety, belching, or bloating.    Endocrine: Negative for cold intolerance, heat intolerance, polydipsia, polyphagia and polyuria.        No hair loss or dry skin.  No hot flashes.     Genitourinary: Negative for difficulty urinating, dyspareunia, dysuria, flank pain, frequency, hematuria, pelvic pain, urgency, vaginal bleeding and vaginal discharge.        No nocturia or incomplete emptying, but has some mild stress incontinence.   Musculoskeletal: Positive for neck pain. Negative for arthralgias, back pain, gait problem, joint swelling, myalgias and neck stiffness.        No joint stiffness.   Skin: Negative for rash.        No new skin lesions or changes in skin lesions. No breast pain or masses.  No nipple discharge or nipple inversion.  "  Neurological: Negative for dizziness, tremors, syncope, speech difficulty, weakness, light-headedness, numbness and headaches.        No tingling.  Reports memory issues.   Hematological: Negative for adenopathy. Does not bruise/bleed easily.   Psychiatric/Behavioral: Positive for decreased concentration and sleep disturbance. Negative for confusion and suicidal ideas. The patient is nervous/anxious.         Worsened depression.           Objective     Vitals:    12/23/19 1607   BP: 140/80   BP Location: Right arm   Pulse: 72   Temp: 97.6 °F (36.4 °C)   TempSrc: Temporal   Weight: 82.3 kg (181 lb 6 oz)   Height: 172.1 cm (67.75\")       Body mass index is 27.78 kg/m².    Physical Exam   Constitutional: She appears well-developed and well-nourished.   HENT:   Head: Normocephalic and atraumatic.   Right Ear: Tympanic membrane, external ear and ear canal normal.   Left Ear: Tympanic membrane, external ear and ear canal normal.   Mouth/Throat: Oropharynx is clear and moist. No oral lesions.   Tonsils normal.   Eyes: Pupils are equal, round, and reactive to light. Conjunctivae and EOM are normal.   Neck: Normal range of motion. Neck supple. Carotid bruit is not present. No thyroid mass and no thyromegaly present.   Cardiovascular: Normal rate, regular rhythm and intact distal pulses. Exam reveals no gallop and no friction rub.   No murmur heard.  No peripheral edema.   Pulmonary/Chest: Effort normal and breath sounds normal. Right breast exhibits no inverted nipple, no mass, no nipple discharge, no skin change and no tenderness. Left breast exhibits no inverted nipple, no mass, no nipple discharge, no skin change and no tenderness.   Abdominal: Soft. Bowel sounds are normal. She exhibits no distension, no abdominal bruit and no mass. There is no hepatosplenomegaly. There is no tenderness.   Rectal exam deferred.   Genitourinary: Vagina normal and uterus normal. Pelvic exam was performed with patient prone. There is no " rash or lesion on the right labia. There is no rash or lesion on the left labia. Uterus is not tender. Cervix exhibits no motion tenderness, no discharge and no friability. Right adnexum displays no mass and no tenderness. Left adnexum displays no mass and no tenderness. No vaginal discharge found.   Genitourinary Comments: Cervix is without lesions or erythema.  No uterine masses.   Musculoskeletal: Normal range of motion. She exhibits no edema or tenderness.   Lymphadenopathy:     She has no cervical adenopathy.     She has no axillary adenopathy. No inguinal adenopathy noted on the right or left side.        Right: No inguinal and no supraclavicular adenopathy present.        Left: No inguinal and no supraclavicular adenopathy present.   Neurological: She is alert. She has normal strength and normal reflexes. No cranial nerve deficit. She exhibits normal muscle tone. Coordination and gait normal.   Skin: No lesion and no rash noted.   No atypical skin lesions.   Psychiatric: She has a normal mood and affect.   Nursing note and vitals reviewed.    PHQ-9 Depression Screening  Little interest or pleasure in doing things? 1   Feeling down, depressed, or hopeless? 1   Trouble falling or staying asleep, or sleeping too much? 2(staying asleep )   Feeling tired or having little energy? 2(little interest and enery )   Poor appetite or overeating? 0   Feeling bad about yourself - or that you are a failure or have let yourself or your family down? 3   Trouble concentrating on things, such as reading the newspaper or watching television? 3   Moving or speaking so slowly that other people could have noticed? Or the opposite - being so fidgety or restless that you have been moving around a lot more than usual? 3(fidgety )   Thoughts that you would be better off dead, or of hurting yourself in some way? 1   PHQ-9 Total Score 16   If you checked off any problems, how difficult have these problems made it for you to do your work,  take care of things at home, or get along with other people? Somewhat difficult     PHQ-9 Total Score: 16      Counseling was given to patient for the following topics:  appropriate exercise, healthy eating habits, disease prevention, risk factors for cancer, importance of self breast exam and breast health, importance of immunizations, including risks and benefits, bone health, sun safety, seatbelt use and safe driving. Also discussed the importance of regular dental and vision care, as well recommendation for a yearly screening skin exam after age 40.  Written information provided to patient on these topics and other health maintenance issues.    Assessment/Plan       Jamee was seen today for annual exam.    Diagnoses and all orders for this visit:    Encounter for health maintenance examination in adult  -     Lipid Panel; Future  -     Comprehensive Metabolic Panel; Future  -     TSH; Future  -     Vitamin D 25 Hydroxy; Future  -     CBC & Differential; Future  -     Magnesium; Future    Encounter for cervical Pap smear with pelvic exam  -     Liquid-based Pap Smear, Screening; Future  -     Liquid-based Pap Smear, Screening    Attention or concentration deficit  -     methylphenidate (CONCERTA) 36 MG CR tablet; Take 1 tablet by mouth Every Morning (restart)    The patient was instructed in the side effects of the medication.  Risks of the potential for tolerance, dependence, and addiction were discussed.  The patient was instructed to take the lowest dosage of the medication, at the lowest frequency, and for the shortest period of time possible.  The patient was instructed not to receive controlled substances or narcotics from other doctors, and not to giveaway or sell the medication.    The patient was instructed to abstain from illicit drug use.  The patient was instructed to avoid alcohol while taking these medications.      Narcotics/controlled substance agreement, Long report, and Urine Drug Screen  were updated today if needed.    Gastroesophageal reflux disease, esophagitis presence not specified   Continue current medication(s) as noted in the history of present illness.    Osteopenia, unspecified location  -     Vitamin D 25 Hydroxy; Future   Continue Calcium and Vitamin D supplementation, as well as weight bearing exercises.    Vitamin D deficiency  -     Vitamin D 25 Hydroxy; Future   Continue Vitamin D supplementation.    Other depression   Continue current medication(s) as noted in the history of present illness.    Generalized anxiety disorder   Continue current medication(s) as noted in the history of present illness.    Screening for cardiovascular condition  -     Lipid Panel; Future  -     Comprehensive Metabolic Panel; Future  -     TSH; Future  -     Vitamin D 25 Hydroxy; Future  -     CBC & Differential; Future  -     Magnesium; Future    Need for immunization against influenza  -     Fluarix/Fluzone/Afluria Quad>6 Months    High risk medication use  -     Magnesium; Future  -     Urine Drug Screen - Urine, Clean Catch; Future       The patient agrees to schedule her Mammogram in [place].  She would like to hold on scheduling her DEXA Bone Density scan at this time.    The patient is agrees to call if she would like a Cardiac CT Scan scheduled (information given today).    Recommended Shingrix and hepatitis A vaccines at the pharmacy.      Return in about 3 months (around 3/23/2020) for Recheck ADD.

## 2019-12-24 PROBLEM — F41.1 GENERALIZED ANXIETY DISORDER: Status: ACTIVE | Noted: 2019-12-24

## 2020-01-21 DIAGNOSIS — F32.89 OTHER DEPRESSION: ICD-10-CM

## 2020-01-21 RX ORDER — ESCITALOPRAM OXALATE 10 MG/1
TABLET ORAL
Qty: 30 TABLET | Refills: 0 | Status: SHIPPED | OUTPATIENT
Start: 2020-01-21 | End: 2020-02-24

## 2020-01-24 ENCOUNTER — TELEPHONE (OUTPATIENT)
Dept: INTERNAL MEDICINE | Facility: CLINIC | Age: 60
End: 2020-01-24

## 2020-01-24 DIAGNOSIS — R41.840 ATTENTION OR CONCENTRATION DEFICIT: ICD-10-CM

## 2020-01-24 RX ORDER — METHYLPHENIDATE HYDROCHLORIDE 36 MG/1
36 TABLET ORAL EVERY MORNING
Qty: 30 TABLET | Refills: 0 | Status: SHIPPED | OUTPATIENT
Start: 2020-01-24 | End: 2020-01-24 | Stop reason: SDUPTHER

## 2020-01-24 RX ORDER — METHYLPHENIDATE HYDROCHLORIDE 36 MG/1
36 TABLET ORAL EVERY MORNING
Qty: 30 TABLET | Refills: 0 | Status: SHIPPED | OUTPATIENT
Start: 2020-01-24 | End: 2020-01-27 | Stop reason: SDUPTHER

## 2020-01-27 ENCOUNTER — TELEPHONE (OUTPATIENT)
Dept: INTERNAL MEDICINE | Facility: CLINIC | Age: 60
End: 2020-01-27

## 2020-01-27 DIAGNOSIS — R41.840 ATTENTION OR CONCENTRATION DEFICIT: ICD-10-CM

## 2020-01-27 RX ORDER — METHYLPHENIDATE HYDROCHLORIDE 36 MG/1
36 TABLET ORAL EVERY MORNING
Qty: 30 TABLET | Refills: 0 | Status: SHIPPED | OUTPATIENT
Start: 2020-01-27 | End: 2020-03-03 | Stop reason: SDUPTHER

## 2020-01-27 NOTE — TELEPHONE ENCOUNTER
Pt called prescription from Friday was sent to the wrong pharmacy and stated that she'd like the prescription it sent to [facility].    methylphenidate (CONCERTA) 36 MG CR tablet    Pharmacy: [facility] in [address]-confirmed  PH: [phone]  FX:[phone]    Patient callback: [phone]    Please advise.

## 2020-02-06 ENCOUNTER — TELEPHONE (OUTPATIENT)
Dept: INTERNAL MEDICINE | Facility: CLINIC | Age: 60
End: 2020-02-06

## 2020-02-06 NOTE — TELEPHONE ENCOUNTER
2-6-20  lmom to call office back office number given ( needs to come in for FASTING labs they were ordered back in December)

## 2020-02-22 DIAGNOSIS — F32.89 OTHER DEPRESSION: ICD-10-CM

## 2020-02-24 RX ORDER — ESCITALOPRAM OXALATE 10 MG/1
TABLET ORAL
Qty: 30 TABLET | Refills: 0 | Status: SHIPPED | OUTPATIENT
Start: 2020-02-24 | End: 2020-03-26

## 2020-03-03 ENCOUNTER — TELEPHONE (OUTPATIENT)
Dept: INTERNAL MEDICINE | Facility: CLINIC | Age: 60
End: 2020-03-03

## 2020-03-03 DIAGNOSIS — R41.840 ATTENTION OR CONCENTRATION DEFICIT: ICD-10-CM

## 2020-03-03 RX ORDER — METHYLPHENIDATE HYDROCHLORIDE 36 MG/1
36 TABLET ORAL EVERY MORNING
Qty: 30 TABLET | Refills: 0 | Status: SHIPPED | OUTPATIENT
Start: 2020-03-03 | End: 2020-04-02 | Stop reason: SDUPTHER

## 2020-03-03 NOTE — TELEPHONE ENCOUNTER
----- Message from Jamee Iniguez sent at 3/3/2020  3:32 PM EST -----  3-3-20  Patient still has pending labs from 12-23-19. Patient was phoned on 2-6-20 never returned phone call so I mailed a lab reminder letter on 2-11-20 no response .  Do you want to d/c orders??    ----- Message -----  From: SYSTEM  Sent: 2/18/2020  12:15 AM EST  To: Erick [facility]

## 2020-03-03 NOTE — TELEPHONE ENCOUNTER
She has an appointment with me on 3/23/2020.  I will discuss this with her at that visit.  Please leave the lab orders open for now.

## 2020-03-03 NOTE — TELEPHONE ENCOUNTER
Patient called and stated that refills are needed for the following prescription(s):    methylphenidate (CONCERTA) 36 MG CR tablet    Pharmacy:  [facility] on [address]-confirmed  PH: [phone]  FX: [phone]    Patient callback: [phone]    Please advise.

## 2020-03-26 DIAGNOSIS — F32.89 OTHER DEPRESSION: ICD-10-CM

## 2020-03-26 RX ORDER — ESCITALOPRAM OXALATE 10 MG/1
TABLET ORAL
Qty: 30 TABLET | Refills: 0 | Status: SHIPPED | OUTPATIENT
Start: 2020-03-26 | End: 2020-04-27

## 2020-04-02 DIAGNOSIS — R41.840 ATTENTION OR CONCENTRATION DEFICIT: ICD-10-CM

## 2020-04-02 RX ORDER — METHYLPHENIDATE HYDROCHLORIDE 36 MG/1
36 TABLET ORAL EVERY MORNING
Qty: 30 TABLET | Refills: 0 | Status: SHIPPED | OUTPATIENT
Start: 2020-04-02 | End: 2020-05-05 | Stop reason: SDUPTHER

## 2020-04-02 NOTE — TELEPHONE ENCOUNTER
Prescription has been sent to the pharmacy.  Please convert the patient's office visit with me on 4/13/20 to a video visit.

## 2020-04-02 NOTE — TELEPHONE ENCOUNTER
PT IS CALLING IN TO REQUEST A REFILL ON methylphenidate (CONCERTA) 36 MG CR tablets.   PT HAS 3 DAYS OF MEDICATION LEFT.    [facility] [address] [phone]      PT CONTACT NUMBER [phone]

## 2020-04-13 ENCOUNTER — TELEMEDICINE (OUTPATIENT)
Dept: INTERNAL MEDICINE | Facility: CLINIC | Age: 60
End: 2020-04-13

## 2020-04-13 VITALS — WEIGHT: 170 LBS | BODY MASS INDEX: 26.04 KG/M2

## 2020-04-13 DIAGNOSIS — R41.840 ATTENTION OR CONCENTRATION DEFICIT: Primary | ICD-10-CM

## 2020-04-13 PROCEDURE — 99213 OFFICE O/P EST LOW 20 MIN: CPT | Performed by: INTERNAL MEDICINE

## 2020-04-13 NOTE — PROGRESS NOTES
Subjective       Jamee Wagner is a 59 y.o. female.     Chief Complaint   Patient presents with   • ADD     follow up       History obtained from the patient.    You have chosen to receive care through a telehealth visit.  Do you consent to use a video/audio connection for your medical care today? Yes      History of Present Illness     ADD Follow-Up: The patient is here for follow up of ADD  which has been stable.    Interval Events:  Concerta was re-started 12/23/19.  Target Symptoms:   1. Hyperactive behavior is denied.   2. Impulsive behavior is worse.  3. Difficulty concentrating is stable.   Medications: Concerta.  Medication Side Effects: None.     The patient is taking the following controlled substance(s) on a long term (greater than three months) basis: Methylphenidate (Concerta/Ritalin).  She is taking controlled substances for other (ADD).  A history was obtained from the patient and the following were reviewed: family history, social history, psychiatric history, and substance abuse history.  A baseline assessment was performed and includes a controlled substance agreement, urine drug screen, KRISTY (within 12 months prior to today's encounter), and a physical exam, if appropriate, was performed within the past year.  It is medically appropriate to prescribe her the medication(s) above.  If applicable, prior treatment records were obtained and reviewed to justify long term prescribing of controlled substances.  The patient was educated on the following: Limited use of the medication, need to discontinue the medication when her condition resolves, proper storage and disposal of medication(s), and risks and dangers associated with controlled substances including tolerance, dependence, and addiction.  A written informed consent was obtained and includes objectives of treatment, further diagnostic testing if appropriate, and an exit strategy for discontinuing the medication on the condition resolves, if  appropriate.  In addition, if appropriate, the patient will be screened for other medical conditions that may impact the prescribing of controlled substances.  Previous treatments have been tried including trials of noncontrolled substances or lower doses of controlled substances.  The controlled medication(s) have been titrated to the level appropriate and necessary and the medication(s) are not causing unacceptable side effects.      Current Outpatient Medications on File Prior to Visit   Medication Sig Dispense Refill   • Cholecalciferol (VITAMIN D3 PO) Take 1,000 Units by mouth Daily.     • escitalopram (LEXAPRO) 10 MG tablet TAKE ONE TABLET BY MOUTH DAILY 30 tablet 0   • methylphenidate (Concerta) 36 MG CR tablet Take 1 tablet by mouth Every Morning 30 tablet 0   • Multiple Vitamins-Minerals (MULTIVITAMIN WITH MINERALS) tablet tablet Take 1 tablet by mouth Daily.     • omeprazole (PRILOSEC) 20 MG capsule Take 20 mg by mouth As Needed.     • traZODone (DESYREL) 50 MG tablet TAKE ONE TO TWO TABLETS BY MOUTH EVERY NIGHT AS NEEDED FOR SLEEP 60 tablet 9     No current facility-administered medications on file prior to visit.        Current outpatient and discharge medications have been reconciled for the patient.  Reviewed by: Jami Morales MD        The following portions of the patient's history were reviewed and updated as appropriate: allergies, current medications, past family history, past medical history, past social history, past surgical history and problem list.    Review of Systems   Constitutional: Negative for appetite change, fatigue and unexpected weight change.   Respiratory: Negative for shortness of breath.    Cardiovascular: Negative for chest pain and palpitations.   Gastrointestinal: Negative for abdominal pain, diarrhea, nausea and vomiting.   Neurological: Negative for headaches.        No tics.  No memory loss.   Psychiatric/Behavioral: Positive for decreased concentration. Negative for  agitation, confusion, sleep disturbance and suicidal ideas. The patient is not nervous/anxious.         No depression.         Objective       Weight 77.1 kg (170 lb).      Physical Exam   Constitutional:   Overweight.   Neurological: She is alert.   Psychiatric: She has a normal mood and affect.   Vitals reviewed.      Assessment / Plan:  Jamee was seen today for adhd.    Diagnoses and all orders for this visit:    Attention or concentration deficit     Continue current medication(s) as noted in the history of present illness.    The patient was instructed in the side effects of the medication.  Risks of the potential for tolerance, dependence, and addiction were discussed.  The patient was instructed to take the lowest dosage of the medication, at the lowest frequency, and for the shortest period of time possible.  The patient was instructed not to receive controlled substances or narcotics from other doctors, and not to giveaway or sell the medication.     The patient was instructed to abstain from illicit drug use.  The patient was instructed to avoid alcohol while taking these medications.       Narcotics/controlled substance agreement, Long report, and Urine Drug Screen were updated today if needed.     The patient was instructed to return for fasting labs ordered on 12/23/2019, as soon as the Coronavirus Pandemic is over.    Return in about 3 months (around 7/13/2020) for Recheck ADD (in office), non-fasting.

## 2020-04-13 NOTE — PATIENT INSTRUCTIONS
Please return for fasting labs ordered on 12/23/2019, as soon as the Coronavirus Pandemic is over.

## 2020-04-14 ENCOUNTER — TELEPHONE (OUTPATIENT)
Dept: INTERNAL MEDICINE | Facility: CLINIC | Age: 60
End: 2020-04-14

## 2020-04-25 DIAGNOSIS — F32.89 OTHER DEPRESSION: ICD-10-CM

## 2020-04-27 RX ORDER — ESCITALOPRAM OXALATE 10 MG/1
TABLET ORAL
Qty: 30 TABLET | Refills: 0 | Status: SHIPPED | OUTPATIENT
Start: 2020-04-27 | End: 2020-05-27

## 2020-05-05 ENCOUNTER — TELEPHONE (OUTPATIENT)
Dept: INTERNAL MEDICINE | Facility: CLINIC | Age: 60
End: 2020-05-05

## 2020-05-05 DIAGNOSIS — R41.840 ATTENTION OR CONCENTRATION DEFICIT: ICD-10-CM

## 2020-05-05 RX ORDER — METHYLPHENIDATE HYDROCHLORIDE 36 MG/1
36 TABLET ORAL EVERY MORNING
Qty: 30 TABLET | Refills: 0 | Status: SHIPPED | OUTPATIENT
Start: 2020-05-05 | End: 2020-05-05 | Stop reason: SDUPTHER

## 2020-05-05 RX ORDER — METHYLPHENIDATE HYDROCHLORIDE 36 MG/1
36 TABLET ORAL EVERY MORNING
Qty: 30 TABLET | Refills: 0 | Status: SHIPPED | OUTPATIENT
Start: 2020-05-05 | End: 2020-06-02 | Stop reason: SDUPTHER

## 2020-05-27 DIAGNOSIS — F32.89 OTHER DEPRESSION: ICD-10-CM

## 2020-05-27 RX ORDER — ESCITALOPRAM OXALATE 10 MG/1
TABLET ORAL
Qty: 30 TABLET | Refills: 5 | Status: SHIPPED | OUTPATIENT
Start: 2020-05-27 | End: 2020-12-15

## 2020-06-02 DIAGNOSIS — R41.840 ATTENTION OR CONCENTRATION DEFICIT: ICD-10-CM

## 2020-06-03 RX ORDER — METHYLPHENIDATE HYDROCHLORIDE 36 MG/1
36 TABLET ORAL EVERY MORNING
Qty: 30 TABLET | Refills: 0 | Status: SHIPPED | OUTPATIENT
Start: 2020-06-03 | End: 2020-07-07 | Stop reason: SDUPTHER

## 2020-06-03 NOTE — TELEPHONE ENCOUNTER
5/5/2020 last Rx # 30  Refill 0      12/23/19-  CSA signed, UDS appropriate.  4/14/20- Long appropriate.

## 2020-07-07 DIAGNOSIS — R41.840 ATTENTION OR CONCENTRATION DEFICIT: ICD-10-CM

## 2020-07-08 RX ORDER — METHYLPHENIDATE HYDROCHLORIDE 36 MG/1
36 TABLET ORAL EVERY MORNING
Qty: 30 TABLET | Refills: 0 | Status: SHIPPED | OUTPATIENT
Start: 2020-07-08 | End: 2020-08-09 | Stop reason: SDUPTHER

## 2020-07-08 NOTE — TELEPHONE ENCOUNTER
Last OV 04/13/2020  Next OV 07/18/2020    Long 04/14/2020  CSA 12/23/19  UDS 2/23/19    Last Rx 06/03/2020 #30 refills 0

## 2020-07-18 ENCOUNTER — OFFICE VISIT (OUTPATIENT)
Dept: INTERNAL MEDICINE | Facility: CLINIC | Age: 60
End: 2020-07-18

## 2020-07-18 VITALS
TEMPERATURE: 98 F | SYSTOLIC BLOOD PRESSURE: 140 MMHG | OXYGEN SATURATION: 98 % | DIASTOLIC BLOOD PRESSURE: 80 MMHG | RESPIRATION RATE: 20 BRPM | WEIGHT: 168.5 LBS | BODY MASS INDEX: 25.81 KG/M2 | HEART RATE: 79 BPM

## 2020-07-18 DIAGNOSIS — D17.79 LIPOMA OF OTHER SPECIFIED SITES: ICD-10-CM

## 2020-07-18 DIAGNOSIS — R41.840 ATTENTION OR CONCENTRATION DEFICIT: Primary | ICD-10-CM

## 2020-07-18 PROCEDURE — 99213 OFFICE O/P EST LOW 20 MIN: CPT | Performed by: INTERNAL MEDICINE

## 2020-07-18 NOTE — PROGRESS NOTES
Subjective       Jamee Wagner is a 59 y.o. female.     Chief Complaint   Patient presents with   • ADD     Follow-up       History obtained from the patient.      History of Present Illness     The patient states she had her fasting labs done in [place] yesterday.    The patient is complaining of left upper back pain for greater than 1 year.  It seems to be worse if she sits too long.  Approximately 4 months ago,  she noticed a lump in the area of the pain.  It has not really increased in size since then.  It does not get warm or red, and there is no drainage.  Biofreeze helps the pain.      ADD Follow-Up: The patient is here for follow up of ADD,  which has been stable.    Interval Events:  Concerta was re-started 12/23/19.  Target Symptoms:   1. Hyperactive behavior is denied.   2. Impulsive behavior is worse.  3. Difficulty concentrating is stable.   Medications: Concerta.  Medication Side Effects: None.     The patient is taking the following controlled substance(s) on a long term (greater than three months) basis: Methylphenidate (Concerta/Ritalin).  She is taking controlled substances for other (ADD).  A history was obtained from the patient and the following were reviewed: family history, social history, psychiatric history, and substance abuse history.  A baseline assessment was performed and includes a controlled substance agreement, urine drug screen, KRISTY (within 12 months prior to today's encounter), and a physical exam, if appropriate, was performed within the past year.  It is medically appropriate to prescribe her the medication(s) above.  If applicable, prior treatment records were obtained and reviewed to justify long term prescribing of controlled substances.  The patient was educated on the following: Limited use of the medication, need to discontinue the medication when her condition resolves, proper storage and disposal of medication(s), and risks and dangers associated with controlled  substances including tolerance, dependence, and addiction.  A written informed consent was obtained and includes objectives of treatment, further diagnostic testing if appropriate, and an exit strategy for discontinuing the medication on the condition resolves, if appropriate.  In addition, if appropriate, the patient will be screened for other medical conditions that may impact the prescribing of controlled substances.  Previous treatments have been tried including trials of noncontrolled substances or lower doses of controlled substances.  The controlled medication(s) have been titrated to the level appropriate and necessary and the medication(s) are not causing unacceptable side effects.         Current Outpatient Medications on File Prior to Visit   Medication Sig Dispense Refill   • Cholecalciferol (VITAMIN D3 PO) Take 1,000 Units by mouth Daily.     • escitalopram (LEXAPRO) 10 MG tablet TAKE ONE TABLET BY MOUTH DAILY 30 tablet 5   • methylphenidate (Concerta) 36 MG CR tablet Take 1 tablet by mouth Every Morning 30 tablet 0   • Multiple Vitamins-Minerals (MULTIVITAMIN WITH MINERALS) tablet tablet Take 1 tablet by mouth Daily.     • omeprazole (PRILOSEC) 20 MG capsule Take 20 mg by mouth As Needed.     • traZODone (DESYREL) 50 MG tablet TAKE ONE TO TWO TABLETS BY MOUTH EVERY NIGHT AS NEEDED FOR SLEEP 60 tablet 9     No current facility-administered medications on file prior to visit.        Current outpatient and discharge medications have been reconciled for the patient.  Reviewed by: Jami Morales MD        The following portions of the patient's history were reviewed and updated as appropriate: allergies, current medications, past family history, past medical history, past social history, past surgical history and problem list.    Review of Systems   Constitutional: Negative for appetite change, chills, fatigue, fever and unexpected weight change.   Respiratory: Negative for shortness of breath.     Cardiovascular: Negative for chest pain and palpitations.   Gastrointestinal: Negative for abdominal pain, diarrhea, nausea and vomiting.   Skin:        As per HPI.   Neurological: Negative for headaches.        No tics.  No memory loss.   Psychiatric/Behavioral: Positive for decreased concentration. Negative for agitation, confusion, sleep disturbance and suicidal ideas. The patient is not nervous/anxious.         No depression.         Objective       Blood pressure 140/80, pulse 79, temperature 98 °F (36.7 °C), temperature source Temporal, resp. rate 20, weight 76.4 kg (168 lb 8 oz), SpO2 98 %, not currently breastfeeding.      Physical Exam   Constitutional: She appears well-developed and well-nourished.   Cardiovascular: Normal rate, regular rhythm and normal heart sounds.   No murmur heard.  Pulmonary/Chest: Effort normal and breath sounds normal.   Neurological: She is alert.   Skin:   Soft non-tender mass left upper back.   Psychiatric: She has a normal mood and affect.   Nursing note and vitals reviewed.      Assessment / Plan:  Jamee was seen today for add.    Diagnoses and all orders for this visit:    Attention or concentration deficit   Continue current medication(s) as noted in the history of present illness.    The patient was instructed in the side effects of the medication.  Risks of the potential for tolerance, dependence, and addiction were discussed.  The patient was instructed to take the lowest dosage of the medication, at the lowest frequency, and for the shortest period of time possible.  The patient was instructed not to receive controlled substances or narcotics from other doctors, and not to giveaway or sell the medication.    The patient was instructed to abstain from illicit drug use.  The patient was instructed to avoid alcohol while taking these medications.      Narcotics/controlled substance agreement, Long report, and Urine Drug Screen were updated today if needed.    Lipoma  of other specified sites   Recommend an ultrasound, and patient agrees to think about it.  She is self-pay and states she may call around to check on pricing before scheduling.      Return in about 3 months (around 10/18/2020) for Recheck ADD, non-fasting.

## 2020-08-09 DIAGNOSIS — R41.840 ATTENTION OR CONCENTRATION DEFICIT: ICD-10-CM

## 2020-08-10 RX ORDER — METHYLPHENIDATE HYDROCHLORIDE 36 MG/1
36 TABLET ORAL EVERY MORNING
Qty: 30 TABLET | Refills: 0 | Status: SHIPPED | OUTPATIENT
Start: 2020-08-10 | End: 2020-09-10 | Stop reason: SDUPTHER

## 2020-08-10 NOTE — TELEPHONE ENCOUNTER
7/8/2020 last rx  # 30  Refill 0   12/23/19-  CSA signed, UDS appropriate.  07/18/20- Long appropriate.  7/18/2020 last office visit

## 2020-09-10 DIAGNOSIS — R41.840 ATTENTION OR CONCENTRATION DEFICIT: ICD-10-CM

## 2020-09-11 RX ORDER — METHYLPHENIDATE HYDROCHLORIDE 36 MG/1
36 TABLET ORAL EVERY MORNING
Qty: 30 TABLET | Refills: 0 | Status: SHIPPED | OUTPATIENT
Start: 2020-09-11 | End: 2020-10-09 | Stop reason: SDUPTHER

## 2020-09-11 NOTE — TELEPHONE ENCOUNTER
8/10/2020  Last refill 8/10/2020  #30  Refill 0   12/23/19-  CSA signed, UDS appropriate.  07/18/20- Long appropriate.

## 2020-10-04 DIAGNOSIS — F51.01 PRIMARY INSOMNIA: Primary | ICD-10-CM

## 2020-10-05 RX ORDER — TRAZODONE HYDROCHLORIDE 50 MG/1
TABLET ORAL
Qty: 60 TABLET | Refills: 11 | Status: SHIPPED | OUTPATIENT
Start: 2020-10-05 | End: 2022-07-08 | Stop reason: SDUPTHER

## 2020-10-09 DIAGNOSIS — R41.840 ATTENTION OR CONCENTRATION DEFICIT: ICD-10-CM

## 2020-10-09 RX ORDER — METHYLPHENIDATE HYDROCHLORIDE 36 MG/1
36 TABLET ORAL EVERY MORNING
Qty: 30 TABLET | Refills: 0 | Status: SHIPPED | OUTPATIENT
Start: 2020-10-09 | End: 2020-11-08 | Stop reason: SDUPTHER

## 2020-10-16 ENCOUNTER — OFFICE VISIT (OUTPATIENT)
Dept: INTERNAL MEDICINE | Facility: CLINIC | Age: 60
End: 2020-10-16

## 2020-10-16 VITALS
BODY MASS INDEX: 26.47 KG/M2 | RESPIRATION RATE: 18 BRPM | OXYGEN SATURATION: 96 % | WEIGHT: 172.8 LBS | SYSTOLIC BLOOD PRESSURE: 128 MMHG | DIASTOLIC BLOOD PRESSURE: 80 MMHG | TEMPERATURE: 98.4 F | HEART RATE: 89 BPM

## 2020-10-16 DIAGNOSIS — Z23 NEED FOR IMMUNIZATION AGAINST INFLUENZA: ICD-10-CM

## 2020-10-16 DIAGNOSIS — R41.840 ATTENTION OR CONCENTRATION DEFICIT: Primary | ICD-10-CM

## 2020-10-16 PROCEDURE — 90686 IIV4 VACC NO PRSV 0.5 ML IM: CPT | Performed by: INTERNAL MEDICINE

## 2020-10-16 PROCEDURE — 90471 IMMUNIZATION ADMIN: CPT | Performed by: INTERNAL MEDICINE

## 2020-10-16 PROCEDURE — 99213 OFFICE O/P EST LOW 20 MIN: CPT | Performed by: INTERNAL MEDICINE

## 2020-10-16 NOTE — PROGRESS NOTES
Subjective       Jamee Wagner is a 59 y.o. female.     Chief Complaint   Patient presents with   • ADHD       History obtained from the patient.      History of Present Illness     ADD Follow-Up: The patient is here for follow up of ADD,  which has been stable.    Interval Events:  Concerta was re-started 12/23/19.  Target Symptoms:   1. Hyperactive behavior is denied.   2. Impulsive behavior is worse.  3. Difficulty concentrating is stable.   Medications: Concerta.  Medication Side Effects: None.     The patient is taking the following controlled substance(s) on a long term (greater than three months) basis: Methylphenidate (Concerta/Ritalin).  She is taking controlled substances for other (ADD).  A history was obtained from the patient and the following were reviewed: family history, social history, psychiatric history, and substance abuse history.  A baseline assessment was performed and includes a controlled substance agreement, urine drug screen, KRISTY (within 12 months prior to today's encounter), and a physical exam, if appropriate, was performed within the past year.  It is medically appropriate to prescribe her the medication(s) above.  If applicable, prior treatment records were obtained and reviewed to justify long term prescribing of controlled substances.  The patient was educated on the following: Limited use of the medication, need to discontinue the medication when her condition resolves, proper storage and disposal of medication(s), and risks and dangers associated with controlled substances including tolerance, dependence, and addiction.  A written informed consent was obtained and includes objectives of treatment, further diagnostic testing if appropriate, and an exit strategy for discontinuing the medication on the condition resolves, if appropriate.  In addition, if appropriate, the patient will be screened for other medical conditions that may impact the prescribing of controlled  substances.  Previous treatments have been tried including trials of noncontrolled substances or lower doses of controlled substances.  The controlled medication(s) have been titrated to the level appropriate and necessary and the medication(s) are not causing unacceptable side effects.      Current Outpatient Medications on File Prior to Visit   Medication Sig Dispense Refill   • Cholecalciferol (VITAMIN D3 PO) Take 1,000 Units by mouth Daily.     • escitalopram (LEXAPRO) 10 MG tablet TAKE ONE TABLET BY MOUTH DAILY 30 tablet 5   • methylphenidate (Concerta) 36 MG CR tablet Take 1 tablet by mouth Every Morning 30 tablet 0   • Multiple Vitamins-Minerals (MULTIVITAMIN WITH MINERALS) tablet tablet Take 1 tablet by mouth Daily.     • omeprazole (PRILOSEC) 20 MG capsule Take 20 mg by mouth As Needed.     • traZODone (DESYREL) 50 MG tablet TAKE ONE TO TWO TABLETS BY MOUTH ONCE NIGHTLY AS NEEDED FOR SLEEP 60 tablet 11     No current facility-administered medications on file prior to visit.        Current outpatient and discharge medications have been reconciled for the patient.  Reviewed by: Jami Morales MD        The following portions of the patient's history were reviewed and updated as appropriate: allergies, current medications, past family history, past medical history, past social history, past surgical history and problem list.    Review of Systems      Objective       Blood pressure 128/80, pulse 89, temperature 98.4 °F (36.9 °C), temperature source Temporal, resp. rate 18, weight 78.4 kg (172 lb 12.8 oz), SpO2 96 %, not currently breastfeeding.      Physical Exam    Assessment / Plan:  Diagnoses and all orders for this visit:    1. Attention or concentration deficit (Primary)   Continue current medication(s) as noted in the history of present illness.    The patient was instructed in the side effects of the medication.  Risks of the potential for tolerance, dependence, and addiction were discussed.  The patient  was instructed to take the lowest dosage of the medication, at the lowest frequency, and for the shortest period of time possible.  The patient was instructed not to receive controlled substances or narcotics from other doctors, and not to giveaway or sell the medication.    The patient was instructed to abstain from illicit drug use.  The patient was instructed to avoid alcohol while taking these medications.      Narcotics/controlled substance agreement, Long report, and Urine Drug Screen were updated today if needed.      2. Need for immunization against influenza  -     Fluarix/Fluzone/Afluria Quad>6 Months        Return in about 3 months (around 1/16/2021) for Annual physical, fasting.

## 2020-11-08 DIAGNOSIS — R41.840 ATTENTION OR CONCENTRATION DEFICIT: ICD-10-CM

## 2020-11-09 RX ORDER — METHYLPHENIDATE HYDROCHLORIDE 36 MG/1
36 TABLET ORAL EVERY MORNING
Qty: 30 TABLET | Refills: 0 | Status: SHIPPED | OUTPATIENT
Start: 2020-11-09 | End: 2020-12-08 | Stop reason: SDUPTHER

## 2020-12-08 DIAGNOSIS — R41.840 ATTENTION OR CONCENTRATION DEFICIT: ICD-10-CM

## 2020-12-09 RX ORDER — METHYLPHENIDATE HYDROCHLORIDE 36 MG/1
36 TABLET ORAL EVERY MORNING
Qty: 30 TABLET | Refills: 0 | Status: SHIPPED | OUTPATIENT
Start: 2020-12-09 | End: 2021-01-10 | Stop reason: SDUPTHER

## 2020-12-14 DIAGNOSIS — F32.89 OTHER DEPRESSION: ICD-10-CM

## 2020-12-15 RX ORDER — ESCITALOPRAM OXALATE 10 MG/1
TABLET ORAL
Qty: 30 TABLET | Refills: 11 | Status: SHIPPED | OUTPATIENT
Start: 2020-12-15 | End: 2022-01-03

## 2021-01-10 DIAGNOSIS — R41.840 ATTENTION OR CONCENTRATION DEFICIT: ICD-10-CM

## 2021-01-11 RX ORDER — METHYLPHENIDATE HYDROCHLORIDE 36 MG/1
36 TABLET ORAL EVERY MORNING
Qty: 30 TABLET | Refills: 0 | Status: SHIPPED | OUTPATIENT
Start: 2021-01-11 | End: 2021-02-12 | Stop reason: SDUPTHER

## 2021-01-15 ENCOUNTER — TRANSCRIBE ORDERS (OUTPATIENT)
Dept: ADMINISTRATIVE | Facility: HOSPITAL | Age: 61
End: 2021-01-15

## 2021-01-15 ENCOUNTER — TELEPHONE (OUTPATIENT)
Dept: INTERNAL MEDICINE | Facility: CLINIC | Age: 61
End: 2021-01-15

## 2021-01-15 DIAGNOSIS — Z12.31 VISIT FOR SCREENING MAMMOGRAM: Primary | ICD-10-CM

## 2021-01-15 DIAGNOSIS — R22.2 MASS ON BACK: Primary | ICD-10-CM

## 2021-01-15 NOTE — TELEPHONE ENCOUNTER
Call please.  What problems it she currently having?  Ultrasound of the shoulder is not typically a test that is done.  Does she mean MRI?

## 2021-01-15 NOTE — TELEPHONE ENCOUNTER
Patient requested a call back. Patient would like to know if Dr. Morales could order an ultrasound of her left shoulder to be done on 1/22/21 in [place] as she will be in town that day. Patient stated she has previously discussed getting this ultrasound done with Dr. Morales.    Please call and advise. Patient call back [phone]

## 2021-01-18 NOTE — TELEPHONE ENCOUNTER
Patient stated that there is a pinching pain and a little bump there on left side. She stated that at a previous office visit she thought you mentioned an ultrasound but it could have been MRI

## 2021-01-18 NOTE — TELEPHONE ENCOUNTER
Spoke to patient and gave her providers message. Patient stated that yes that was it and would like to get the U/S done please.

## 2021-01-18 NOTE — TELEPHONE ENCOUNTER
2 visits ago, I recommend an ultrasound for a possible lipoma (fatty tumor) on her left upper back.  Is that what she is referring to?

## 2021-01-22 ENCOUNTER — OFFICE VISIT (OUTPATIENT)
Dept: INTERNAL MEDICINE | Facility: CLINIC | Age: 61
End: 2021-01-22

## 2021-01-22 VITALS
RESPIRATION RATE: 16 BRPM | SYSTOLIC BLOOD PRESSURE: 130 MMHG | DIASTOLIC BLOOD PRESSURE: 84 MMHG | HEIGHT: 68 IN | WEIGHT: 169 LBS | HEART RATE: 72 BPM | BODY MASS INDEX: 25.61 KG/M2 | TEMPERATURE: 96.9 F

## 2021-01-22 DIAGNOSIS — Z00.00 ENCOUNTER FOR HEALTH MAINTENANCE EXAMINATION IN ADULT: Primary | ICD-10-CM

## 2021-01-22 DIAGNOSIS — R41.840 ATTENTION OR CONCENTRATION DEFICIT: ICD-10-CM

## 2021-01-22 DIAGNOSIS — Z13.6 SCREENING FOR CARDIOVASCULAR CONDITION: ICD-10-CM

## 2021-01-22 DIAGNOSIS — K21.9 GASTROESOPHAGEAL REFLUX DISEASE, UNSPECIFIED WHETHER ESOPHAGITIS PRESENT: ICD-10-CM

## 2021-01-22 DIAGNOSIS — M85.89 OSTEOPENIA OF MULTIPLE SITES: ICD-10-CM

## 2021-01-22 DIAGNOSIS — E55.9 VITAMIN D DEFICIENCY: ICD-10-CM

## 2021-01-22 DIAGNOSIS — F32.89 OTHER DEPRESSION: ICD-10-CM

## 2021-01-22 DIAGNOSIS — Z79.899 HIGH RISK MEDICATION USE: ICD-10-CM

## 2021-01-22 DIAGNOSIS — F41.1 GENERALIZED ANXIETY DISORDER: ICD-10-CM

## 2021-01-22 DIAGNOSIS — R06.83 SNORING: ICD-10-CM

## 2021-01-22 LAB
BASOPHILS # BLD AUTO: 0.05 10*3/MM3 (ref 0–0.2)
BASOPHILS NFR BLD AUTO: 0.7 % (ref 0–1.5)
DEPRECATED RDW RBC AUTO: 40.6 FL (ref 37–54)
EOSINOPHIL # BLD AUTO: 0.05 10*3/MM3 (ref 0–0.4)
EOSINOPHIL NFR BLD AUTO: 0.7 % (ref 0.3–6.2)
ERYTHROCYTE [DISTWIDTH] IN BLOOD BY AUTOMATED COUNT: 11.7 % (ref 12.3–15.4)
HCT VFR BLD AUTO: 37.8 % (ref 34–46.6)
HGB BLD-MCNC: 13.1 G/DL (ref 12–15.9)
IMM GRANULOCYTES # BLD AUTO: 0.01 10*3/MM3 (ref 0–0.05)
IMM GRANULOCYTES NFR BLD AUTO: 0.1 % (ref 0–0.5)
LYMPHOCYTES # BLD AUTO: 1.71 10*3/MM3 (ref 0.7–3.1)
LYMPHOCYTES NFR BLD AUTO: 25.1 % (ref 19.6–45.3)
MCH RBC QN AUTO: 33.3 PG (ref 26.6–33)
MCHC RBC AUTO-ENTMCNC: 34.7 G/DL (ref 31.5–35.7)
MCV RBC AUTO: 96.2 FL (ref 79–97)
MONOCYTES # BLD AUTO: 0.41 10*3/MM3 (ref 0.1–0.9)
MONOCYTES NFR BLD AUTO: 6 % (ref 5–12)
NEUTROPHILS NFR BLD AUTO: 4.57 10*3/MM3 (ref 1.7–7)
NEUTROPHILS NFR BLD AUTO: 67.4 % (ref 42.7–76)
NRBC BLD AUTO-RTO: 0 /100 WBC (ref 0–0.2)
PLATELET # BLD AUTO: 264 10*3/MM3 (ref 140–450)
PMV BLD AUTO: 10.6 FL (ref 6–12)
RBC # BLD AUTO: 3.93 10*6/MM3 (ref 3.77–5.28)
WBC # BLD AUTO: 6.8 10*3/MM3 (ref 3.4–10.8)

## 2021-01-22 PROCEDURE — 83735 ASSAY OF MAGNESIUM: CPT | Performed by: INTERNAL MEDICINE

## 2021-01-22 PROCEDURE — 36415 COLL VENOUS BLD VENIPUNCTURE: CPT | Performed by: INTERNAL MEDICINE

## 2021-01-22 PROCEDURE — 99396 PREV VISIT EST AGE 40-64: CPT | Performed by: INTERNAL MEDICINE

## 2021-01-22 PROCEDURE — 85025 COMPLETE CBC W/AUTO DIFF WBC: CPT | Performed by: INTERNAL MEDICINE

## 2021-01-22 PROCEDURE — 84443 ASSAY THYROID STIM HORMONE: CPT | Performed by: INTERNAL MEDICINE

## 2021-01-22 PROCEDURE — 80053 COMPREHEN METABOLIC PANEL: CPT | Performed by: INTERNAL MEDICINE

## 2021-01-22 NOTE — PATIENT INSTRUCTIONS
I recommend Shingrix (new Shingles vaccine) and Hepatitis A vaccination at the pharmacy.    Recommend [facility].   Information was given today.      Health Maintenance for Postmenopausal Women  Menopause is a normal process in which your ability to get pregnant comes to an end. This process happens slowly over many months or years, usually between the ages of 48 and 55. Menopause is complete when you have missed your menstrual periods for 12 months.  It is important to talk with your health care provider about some of the most common conditions that affect women after menopause (postmenopausal women). These include heart disease, cancer, and bone loss (osteoporosis). Adopting a healthy lifestyle and getting preventive care can help to promote your health and wellness. The actions you take can also lower your chances of developing some of these common conditions.  What should I know about menopause?  During menopause, you may get a number of symptoms, such as:  · Hot flashes. These can be moderate or severe.  · Night sweats.  · Decrease in sex drive.  · Mood swings.  · Headaches.  · Tiredness.  · Irritability.  · Memory problems.  · Insomnia.  Choosing to treat or not to treat these symptoms is a decision that you make with your health care provider.  Do I need hormone replacement therapy?  · Hormone replacement therapy is effective in treating symptoms that are caused by menopause, such as hot flashes and night sweats.  · Hormone replacement carries certain risks, especially as you become older. If you are thinking about using estrogen or estrogen with progestin, discuss the benefits and risks with your health care provider.  What is my risk for heart disease and stroke?  The risk of heart disease, heart attack, and stroke increases as you age. One of the causes may be a change in the body's hormones during menopause. This can affect how your body uses dietary fats, triglycerides, and  cholesterol. Heart attack and stroke are medical emergencies. There are many things that you can do to help prevent heart disease and stroke.  Watch your blood pressure  · High blood pressure causes heart disease and increases the risk of stroke. This is more likely to develop in people who have high blood pressure readings, are of  descent, or are overweight.  · Have your blood pressure checked:  ? Every 3-5 years if you are 18-39 years of age.  ? Every year if you are 40 years old or older.  Eat a healthy diet    · Eat a diet that includes plenty of vegetables, fruits, low-fat dairy products, and lean protein.  · Do not eat a lot of foods that are high in solid fats, added sugars, or sodium.  Get regular exercise  Get regular exercise. This is one of the most important things you can do for your health. Most adults should:  · Try to exercise for at least 150 minutes each week. The exercise should increase your heart rate and make you sweat (moderate-intensity exercise).  · Try to do strengthening exercises at least twice each week. Do these in addition to the moderate-intensity exercise.  · Spend less time sitting. Even light physical activity can be beneficial.  Other tips  · Work with your health care provider to achieve or maintain a healthy weight.  · Do not use any products that contain nicotine or tobacco, such as cigarettes, e-cigarettes, and chewing tobacco. If you need help quitting, ask your health care provider.  · Know your numbers. Ask your health care provider to check your cholesterol and your blood sugar (glucose). Continue to have your blood tested as directed by your health care provider.  Do I need screening for cancer?  Depending on your health history and family history, you may need to have cancer screening at different stages of your life. This may include screening for:  · Breast cancer.  · Cervical cancer.  · Lung cancer.  · Colorectal cancer.  What is my risk for  osteoporosis?  After menopause, you may be at increased risk for osteoporosis. Osteoporosis is a condition in which bone destruction happens more quickly than new bone creation. To help prevent osteoporosis or the bone fractures that can happen because of osteoporosis, you may take the following actions:  · If you are 19-50 years old, get at least 1,000 mg of calcium and at least 600 mg of vitamin D per day.  · If you are older than age 50 but younger than age 70, get at least 1,200 mg of calcium and at least 600 mg of vitamin D per day.  · If you are older than age 70, get at least 1,200 mg of calcium and at least 800 mg of vitamin D per day.  Smoking and drinking excessive alcohol increase the risk of osteoporosis. Eat foods that are rich in calcium and vitamin D, and do weight-bearing exercises several times each week as directed by your health care provider.  How does menopause affect my mental health?  Depression may occur at any age, but it is more common as you become older. Common symptoms of depression include:  · Low or sad mood.  · Changes in sleep patterns.  · Changes in appetite or eating patterns.  · Feeling an overall lack of motivation or enjoyment of activities that you previously enjoyed.  · Frequent crying spells.  Talk with your health care provider if you think that you are experiencing depression.  General instructions  See your health care provider for regular wellness exams and vaccines. This may include:  · Scheduling regular health, dental, and eye exams.  · Getting and maintaining your vaccines. These include:  ? Influenza vaccine. Get this vaccine each year before the flu season begins.  ? Pneumonia vaccine.  ? Shingles vaccine.  ? Tetanus, diphtheria, and pertussis (Tdap) booster vaccine.  Your health care provider may also recommend other immunizations.  Tell your health care provider if you have ever been abused or do not feel safe at home.  Summary  · Menopause is a normal process in  which your ability to get pregnant comes to an end.  · This condition causes hot flashes, night sweats, decreased interest in sex, mood swings, headaches, or lack of sleep.  · Treatment for this condition may include hormone replacement therapy.  · Take actions to keep yourself healthy, including exercising regularly, eating a healthy diet, watching your weight, and checking your blood pressure and blood sugar levels.  · Get screened for cancer and depression. Make sure that you are up to date with all your vaccines.  This information is not intended to replace advice given to you by your health care provider. Make sure you discuss any questions you have with your health care provider.  Document Revised: 12/11/2019 Document Reviewed: 12/11/2019  [employer] Patient Education © 2020 [employer]      Heart-Healthy Eating Plan  Many factors influence your heart (coronary) health, including eating and exercise habits. Coronary risk increases with abnormal blood fat (lipid) levels. Heart-healthy meal planning includes limiting unhealthy fats, increasing healthy fats, and making other diet and lifestyle changes.  What is my plan?  Your health care provider may recommend that you:  · Limit your fat intake to _________% or less of your total calories each day.  · Limit your saturated fat intake to _________% or less of your total calories each day.  · Limit the amount of cholesterol in your diet to less than _________ mg per day.  What are tips for following this plan?  Cooking  Cook foods using methods other than frying. Baking, boiling, grilling, and broiling are all good options. Other ways to reduce fat include:  · Removing the skin from poultry.  · Removing all visible fats from meats.  · Steaming vegetables in water or broth.  Meal planning    · At meals, imagine dividing your plate into fourths:  ? Fill one-half of your plate with vegetables and green salads.  ? Fill one-fourth of your plate with whole grains.  ? Fill  one-fourth of your plate with lean protein foods.  · Eat 4-5 servings of vegetables per day. One serving equals 1 cup raw or cooked vegetable, or 2 cups raw leafy greens.  · Eat 4-5 servings of fruit per day. One serving equals 1 medium whole fruit, ¼ cup dried fruit, ½ cup fresh, frozen, or canned fruit, or ½ cup 100% fruit juice.  · Eat more foods that contain soluble fiber. Examples include apples, broccoli, carrots, beans, peas, and barley. Aim to get 25-30 g of fiber per day.  · Increase your consumption of legumes, nuts, and seeds to 4-5 servings per week. One serving of dried beans or legumes equals ½ cup cooked, 1 serving of nuts is ¼ cup, and 1 serving of seeds equals 1 tablespoon.  Fats  · Choose healthy fats more often. Choose monounsaturated and polyunsaturated fats, such as olive and canola oils, flaxseeds, walnuts, almonds, and seeds.  · Eat more omega-3 fats. Choose salmon, mackerel, sardines, tuna, flaxseed oil, and ground flaxseeds. Aim to eat fish at least 2 times each week.  · Check food labels carefully to identify foods with trans fats or high amounts of saturated fat.  · Limit saturated fats. These are found in animal products, such as meats, butter, and cream. Plant sources of saturated fats include palm oil, palm kernel oil, and coconut oil.  · Avoid foods with partially hydrogenated oils in them. These contain trans fats. Examples are stick margarine, some tub margarines, cookies, crackers, and other baked goods.  · Avoid fried foods.  General information  · Eat more home-cooked food and less restaurant, buffet, and fast food.  · Limit or avoid alcohol.  · Limit foods that are high in starch and sugar.  · Lose weight if you are overweight. Losing just 5-10% of your body weight can help your overall health and prevent diseases such as diabetes and heart disease.  · Monitor your salt (sodium) intake, especially if you have high blood pressure. Talk with your health care provider about your  sodium intake.  · Try to incorporate more vegetarian meals weekly.  What foods can I eat?  Fruits  All fresh, canned (in natural juice), or frozen fruits.  Vegetables  Fresh or frozen vegetables (raw, steamed, roasted, or grilled). Green salads.  Grains  Most grains. Choose whole wheat and whole grains most of the time. Rice and pasta, including brown rice and pastas made with whole wheat.  Meats and other proteins  Lean, well-trimmed beef, veal, pork, and lamb. Chicken and turkey without skin. All fish and shellfish. Wild duck, rabbit, pheasant, and venison. Egg whites or low-cholesterol egg substitutes. Dried beans, peas, lentils, and tofu. Seeds and most nuts.  Dairy  Low-fat or nonfat cheeses, including ricotta and mozzarella. Skim or 1% milk (liquid, powdered, or evaporated). Buttermilk made with low-fat milk. Nonfat or low-fat yogurt.  Fats and oils  Non-hydrogenated (trans-free) margarines. Vegetable oils, including soybean, sesame, sunflower, olive, peanut, safflower, corn, canola, and cottonseed. Salad dressings or mayonnaise made with a vegetable oil.  Beverages  Water (mineral or sparkling). Coffee and tea. Diet carbonated beverages.  Sweets and desserts  Sherbet, gelatin, and fruit ice. Small amounts of dark chocolate.  Limit all sweets and desserts.  Seasonings and condiments  All seasonings and condiments.  The items listed above may not be a complete list of foods and beverages you can eat. Contact a dietitian for more options.  What foods are not recommended?  Fruits  Canned fruit in heavy syrup. Fruit in cream or butter sauce. Fried fruit. Limit coconut.  Vegetables  Vegetables cooked in cheese, cream, or butter sauce. Fried vegetables.  Grains  Breads made with saturated or trans fats, oils, or whole milk. Croissants. Sweet rolls. Donuts. High-fat crackers, such as cheese crackers.  Meats and other proteins  Fatty meats, such as hot dogs, ribs, sausage, brizuela, rib-eye roast or steak. High-fat deli  meats, such as salami and bologna. Caviar. Domestic duck and goose. Organ meats, such as liver.  Dairy  Cream, sour cream, cream cheese, and creamed cottage cheese. Whole milk cheeses. Whole or 2% milk (liquid, evaporated, or condensed). Whole buttermilk. Cream sauce or high-fat cheese sauce. Whole-milk yogurt.  Fats and oils  Meat fat, or shortening. Cocoa butter, hydrogenated oils, palm oil, coconut oil, palm kernel oil. Solid fats and shortenings, including brizuela fat, salt pork, lard, and butter. Nondairy cream substitutes. Salad dressings with cheese or sour cream.  Beverages  Regular sodas and any drinks with added sugar.  Sweets and desserts  Frosting. Pudding. Cookies. Cakes. Pies. Milk chocolate or white chocolate. Buttered syrups. Full-fat ice cream or ice cream drinks.  The items listed above may not be a complete list of foods and beverages to avoid. Contact a dietitian for more information.  Summary  · Heart-healthy meal planning includes limiting unhealthy fats, increasing healthy fats, and making other diet and lifestyle changes.  · Lose weight if you are overweight. Losing just 5-10% of your body weight can help your overall health and prevent diseases such as diabetes and heart disease.  · Focus on eating a balance of foods, including fruits and vegetables, low-fat or nonfat dairy, lean protein, nuts and legumes, whole grains, and heart-healthy oils and fats.  This information is not intended to replace advice given to you by your health care provider. Make sure you discuss any questions you have with your health care provider.  Document Revised: 01/25/2019 Document Reviewed: 01/25/2019  [employer] Patient Education © 2020 [employer]      Exercising to Lose Weight  Exercise is structured, repetitive physical activity to improve fitness and health. Getting regular exercise is important for everyone. It is especially important if you are overweight. Being overweight increases your risk of heart  disease, stroke, diabetes, high blood pressure, and several types of cancer. Reducing your calorie intake and exercising can help you lose weight.  Exercise is usually categorized as moderate or vigorous intensity. To lose weight, most people need to do a certain amount of moderate-intensity or vigorous-intensity exercise each week.  Moderate-intensity exercise    Moderate-intensity exercise is any activity that gets you moving enough to burn at least three times more energy (calories) than if you were sitting.  Examples of moderate exercise include:  · Walking a mile in 15 minutes.  · Doing light yard work.  · Biking at an easy pace.  Most people should get at least 150 minutes (2 hours and 30 minutes) a week of moderate-intensity exercise to maintain their body weight.  Vigorous-intensity exercise  Vigorous-intensity exercise is any activity that gets you moving enough to burn at least six times more calories than if you were sitting. When you exercise at this intensity, you should be working hard enough that you are not able to carry on a conversation.  Examples of vigorous exercise include:  · Running.  · Playing a team sport, such as football, basketball, and soccer.  · Jumping rope.  Most people should get at least 75 minutes (1 hour and 15 minutes) a week of vigorous-intensity exercise to maintain their body weight.  How can exercise affect me?  When you exercise enough to burn more calories than you eat, you lose weight. Exercise also reduces body fat and builds muscle. The more muscle you have, the more calories you burn. Exercise also:  · Improves mood.  · Reduces stress and tension.  · Improves your overall fitness, flexibility, and endurance.  · Increases bone strength.  The amount of exercise you need to lose weight depends on:  · Your age.  · The type of exercise.  · Any health conditions you have.  · Your overall physical ability.  Talk to your health care provider about how much exercise you need and  what types of activities are safe for you.  What actions can I take to lose weight?  Nutrition    · Make changes to your diet as told by your health care provider or diet and nutrition specialist (dietitian). This may include:  ? Eating fewer calories.  ? Eating more protein.  ? Eating less unhealthy fats.  ? Eating a diet that includes fresh fruits and vegetables, whole grains, low-fat dairy products, and lean protein.  ? Avoiding foods with added fat, salt, and sugar.  · Drink plenty of water while you exercise to prevent dehydration or heat stroke.  Activity  · Choose an activity that you enjoy and set realistic goals. Your health care provider can help you make an exercise plan that works for you.  · Exercise at a moderate or vigorous intensity most days of the week.  ? The intensity of exercise may vary from person to person. You can tell how intense a workout is for you by paying attention to your breathing and heartbeat. Most people will notice their breathing and heartbeat get faster with more intense exercise.  · Do resistance training twice each week, such as:  ? Push-ups.  ? Sit-ups.  ? Lifting weights.  ? Using resistance bands.  · Getting short amounts of exercise can be just as helpful as long structured periods of exercise. If you have trouble finding time to exercise, try to include exercise in your daily routine.  ? Get up, stretch, and walk around every 30 minutes throughout the day.  ? Go for a walk during your lunch break.  ? Park your car farther away from your destination.  ? If you take public transportation, get off one stop early and walk the rest of the way.  ? Make phone calls while standing up and walking around.  ? Take the stairs instead of elevators or escalators.  · Wear comfortable clothes and shoes with good support.  · Do not exercise so much that you hurt yourself, feel dizzy, or get very short of breath.  Where to find more information  · [place] Department of Health and Human  Services: www.[facility].gov  · Centers for Disease Control and Prevention (CDC): www.cdc.gov  Contact a health care provider:  · Before starting a new exercise program.  · If you have questions or concerns about your weight.  · If you have a medical problem that keeps you from exercising.  Get help right away if you have any of the following while exercising:  · Injury.  · Dizziness.  · Difficulty breathing or shortness of breath that does not go away when you stop exercising.  · Chest pain.  · Rapid heartbeat.  Summary  · Being overweight increases your risk of heart disease, stroke, diabetes, high blood pressure, and several types of cancer.  · Losing weight happens when you burn more calories than you eat.  · Reducing the amount of calories you eat in addition to getting regular moderate or vigorous exercise each week helps you lose weight.  This information is not intended to replace advice given to you by your health care provider. Make sure you discuss any questions you have with your health care provider.  Document Revised: 12/31/2018 Document Reviewed: 12/31/2018  [employer] Patient Education © 2020 [employer]

## 2021-01-22 NOTE — PROGRESS NOTES
Subjective     Chief Complaint:  Physical Exam.    History of Present Illness    History obtained from the patient.    The patient has a probable Lipoma on her left upper back.  It has enlarged and become painful.  She has an ultrasound scheduled for 1/29/2021.    ADD Follow-Up: The patient is here for follow up of ADD, which has been stable.   Comorbid Illness: Anxiety and Depression.  Interval Events:  Concerta was re-started 12/23/19.  Target Symptoms:   1. Hyperactive behavior is denied.   2. Impulsive behavior is worse.  3. Difficulty concentrating is stable.   Medications: Concerta.  Medication Side Effects: None.     The patient is taking the following controlled substance(s) on a long term (greater than three months) basis: Methylphenidate (Concerta/Ritalin).  She is taking controlled substances for other (ADD).  A history was obtained from the patient and the following were reviewed: family history, social history, psychiatric history, and substance abuse history.  A baseline assessment was performed and includes a controlled substance agreement, urine drug screen, KRISTY (within 12 months prior to today's encounter), and a physical exam, if appropriate, was performed within the past year.  It is medically appropriate to prescribe her the medication(s) above.  If applicable, prior treatment records were obtained and reviewed to justify long term prescribing of controlled substances.  The patient was educated on the following: Limited use of the medication, need to discontinue the medication when her condition resolves, proper storage and disposal of medication(s), and risks and dangers associated with controlled substances including tolerance, dependence, and addiction.  A written informed consent was obtained and includes objectives of treatment, further diagnostic testing if appropriate, and an exit strategy for discontinuing the medication on the condition resolves, if appropriate.  In  addition, if appropriate, the patient will be screened for other medical conditions that may impact the prescribing of controlled substances.  Previous treatments have been tried including trials of noncontrolled substances or lower doses of controlled substances.  The controlled medication(s) have been titrated to the level appropriate and necessary and the medication(s) are not causing unacceptable side effects.      GERD  Follow-up: The patient is being seen for a routine clinic follow-up of Gastroesophageal Reflux Disease, which is stable.   Symptoms:  Stable heartburn (in back).  Has had some belching and bloating. No abdominal pain, acid regurgitation,nausea, vomiting, dysphagia, odynophagia, hematemesis, hematochezia, melena, or early satiety.   Associated Symptoms:  No chronic sore throat, cough, hoarseness, or wheezing.   Medication: Omeprazole daily.      Osteopenia Follow-up: The patient is being seen for routine follow-up of Osteopenia, which is stable.   Interval Events: The last DEXA scan was done 8/17/17, Osteopenia L1-L4, left total hip, and bilateral femoral necks.   Symptoms:   Reports stable mild neck pain.  Denies myalgias, arthralgias, back pain, hip pain, hand pain, paresthesias, loss of balance, and gait instability.    Associated Symptoms: No new fracture.   Medication: Calcium and Vitamin D supplements.     Vitamin D Deficiency Follow-Up: The patient is here for follow-up of Vitamin D Deficiency, which is stable.    Interval Events: Vitamin D level on 7/17/2020 was 28.1.    Symptoms: Denies fatigue, myalgias, arthralgias, paresthesias, loss of balance, and gait instability.    Medication: Vitamin D3 supplement.     Depression and Anxiety Disorder Follow-Up: The patient is being seen for follow-up of Depression and Anxiety, which is unstable.  Comorbid Illnesses:  Insomnia.  Interval Events:   She states her depression and anxiety are always worse this time of year.  Symptoms: Worsened  depression and anxiety, but stable insomnia.  Stable concentration issues, but no memory loss. She has some feelings of hopelessness and guilt, but not worthlessness.  She denies panic attacks and anhedonia.    Associated Symptoms: The patient denies thoughts of suicide.   Social Support: the patient has good social support.   Medications:  Escitalopram.   Side Effects: None.     Jamee aWgner is a 60 y.o. female who presents for an Annual Physical.      PMH, PSH, SocHx, FamHx, Allergies, and Medications: Reviewed and updated.    Outpatient Medications Prior to Visit   Medication Sig Dispense Refill   • Cholecalciferol (VITAMIN D3 PO) Take 1,000 Units by mouth Daily.     • escitalopram (LEXAPRO) 10 MG tablet TAKE ONE TABLET BY MOUTH DAILY 30 tablet 11   • methylphenidate (Concerta) 36 MG CR tablet Take 1 tablet by mouth Every Morning 30 tablet 0   • Multiple Vitamins-Minerals (MULTIVITAMIN WITH MINERALS) tablet tablet Take 1 tablet by mouth Daily.     • omeprazole (PRILOSEC) 20 MG capsule Take 20 mg by mouth As Needed.     • traZODone (DESYREL) 50 MG tablet TAKE ONE TO TWO TABLETS BY MOUTH ONCE NIGHTLY AS NEEDED FOR SLEEP 60 tablet 11     No facility-administered medications prior to visit.        Immunization History   Administered Date(s) Administered   • COVID-19 (MODERNA) 01/04/2021   • Flu Vaccine Quad PF >36MO 10/30/2017   • Flulaval/Fluarix/Fluzone Quad 11/19/2018, 12/23/2019, 10/17/2020   • Pneumococcal Conjugate 13-Valent (PCV13) 11/18/2015   • Td, Not Adsorbed 07/26/2017   • Tdap 01/22/2007   • Zostavax 11/18/2015         Patient Active Problem List   Diagnosis   • Macular degeneration   • Attention or concentration deficit   • Basal cell carcinoma of skin   • Depression   • Gastroesophageal reflux disease   • Menopause present   • Osteopenia   • Vitamin D deficiency   • Generalized anxiety disorder       Health Habits:  Dental Exam. up to date  Eye Exam. up to date  Hearing Loss:  Yes  Exercise: 7  times/week.  Current exercise activities include: light weights/kettlebells, stationary bicycling/spin class and walking  Diet: Healthy  Multivitamin: Yes    Safe Driving:  Yes  Seat Belt:  Yes  Bike Helmet:  N/A  Skin Screening:  Yes  Sunscreen: Yes  SBE / HALEIGH: Yes  Sexual Activity:  Yes  Birth Control:  Menopause  STD Prevention:  N/A    Last Pap: 12/23/2019, normal.  Last Mammogram: 8/17/2017, category 1.  Last DEXA Scan: 8/17/17, Osteopenia L1-L4, left total hip, and bilateral femoral necks.  Last Colonoscopy: 7/29/2014, normal.  Last PSA: N/A    Social:    Social History     Socioeconomic History   • Marital status:      Spouse name: Not on file   • Number of children: 2   • Years of education: Not on file   • Highest education level: Not on file   Occupational History   • Occupation:  [employer]     Comment: part time   • Occupation:  Dental      Comment: full time   Tobacco Use   • Smoking status: Never Smoker   • Smokeless tobacco: Never Used   Substance and Sexual Activity   • Alcohol use: Yes     Comment: 2-3 beers per week   • Drug use: No   • Sexual activity: Yes     Partners: Male     Birth control/protection: Post-menopausal         Current Medical Providers:    Jami Morales MD (Internal Medicine / Pediatrics)    [facility] providers who are involved in the care of this patient are listed above.         Review of Systems   Constitutional: Negative for chills, fatigue, fever and unexpected weight change.        No night sweats.    HENT: Positive for hearing loss and postnasal drip. Negative for congestion, ear pain, nosebleeds, rhinorrhea, sinus pressure, sinus pain, sneezing, sore throat, tinnitus and voice change.         Reports snoring.   Eyes: Negative for photophobia, pain, discharge, redness, itching and visual disturbance.   Respiratory: Negative for apnea, cough, chest tightness, shortness of breath, wheezing and  "stridor.         No chest congestion.  No hemoptysis.   Cardiovascular: Negative for chest pain, palpitations and leg swelling.        No orthopnea, LAZARO, or PND.  No claudication or syncope.   Gastrointestinal: Negative for abdominal pain, blood in stool, constipation, diarrhea, nausea, rectal pain and vomiting.        Stable heartburn, belching, and bloating.  No melena.  No hematemesis.  No dysphagia or odynophagia.  No early satiety.    Endocrine: Negative for cold intolerance, heat intolerance, polydipsia, polyphagia and polyuria.        Has hair loss, but has dry skin.  No hot flashes.     Genitourinary: Negative for difficulty urinating, dyspareunia, dysuria, flank pain, frequency, hematuria, pelvic pain, urgency, vaginal bleeding and vaginal discharge.        No nocturia, incomplete emptying, or incontinence.   Musculoskeletal: Positive for neck pain. Negative for arthralgias, back pain, gait problem, joint swelling, myalgias and neck stiffness.        No joint stiffness.   Skin: Negative for rash.        No new skin lesions or changes in skin lesions. No breast pain or masses.  No nipple discharge or nipple inversion.   Neurological: Negative for dizziness, tremors, syncope, speech difficulty, weakness, light-headedness, numbness and headaches.        No tingling.  No memory loss.  No decreased concentration.   Hematological: Negative for adenopathy. Does not bruise/bleed easily.   Psychiatric/Behavioral: Negative for confusion, sleep disturbance and suicidal ideas. The patient is nervous/anxious.         Slightly worse depression.           Objective     Vitals:    01/22/21 1137   BP: 130/84   BP Location: Right arm   Patient Position: Sitting   Pulse: 72   Resp: 16   Temp: 96.9 °F (36.1 °C)   TempSrc: Temporal   Weight: 76.7 kg (169 lb)   Height: 171.5 cm (67.5\")       Body mass index is 26.08 kg/m².    Physical Exam  Vitals signs and nursing note reviewed.   Constitutional:       Appearance: Normal " appearance. She is well-developed.      Comments: Overweight.   HENT:      Head: Normocephalic and atraumatic.      Right Ear: Tympanic membrane, ear canal and external ear normal.      Left Ear: Tympanic membrane, ear canal and external ear normal.      Mouth/Throat:      Mouth: Mucous membranes are moist. No oral lesions.      Pharynx: Oropharynx is clear.      Comments: Tonsils normal.  Eyes:      Extraocular Movements: Extraocular movements intact.      Conjunctiva/sclera: Conjunctivae normal.      Pupils: Pupils are equal, round, and reactive to light.      Comments: Fundi normal bilaterally.   Neck:      Musculoskeletal: Normal range of motion and neck supple.      Thyroid: No thyromegaly.      Vascular: No carotid bruit.   Cardiovascular:      Rate and Rhythm: Normal rate and regular rhythm.      Pulses: Normal pulses.      Heart sounds: Normal heart sounds. No murmur. No friction rub. No gallop.    Pulmonary:      Effort: Pulmonary effort is normal.      Breath sounds: Normal breath sounds.   Chest:      Breasts:         Right: No inverted nipple, mass, nipple discharge, skin change or tenderness.         Left: No inverted nipple, mass, nipple discharge, skin change or tenderness.   Abdominal:      General: Bowel sounds are normal. There is no distension or abdominal bruit.      Palpations: Abdomen is soft. There is no hepatomegaly, splenomegaly or mass.      Tenderness: There is abdominal tenderness (mild RLQ). There is no right CVA tenderness, left CVA tenderness, guarding or rebound.   Genitourinary:     Comments:  and rectal exam deferred.  Musculoskeletal: Normal range of motion.      Right lower leg: No edema.      Left lower leg: No edema.   Lymphadenopathy:      Cervical: No cervical adenopathy.      Upper Body:      Right upper body: No supraclavicular or axillary adenopathy.      Left upper body: No supraclavicular or axillary adenopathy.   Skin:     Findings: No rash.      Comments: No atypical  skin lesions.  Flesh-colored left upper back mass, mildly tender to palpation.   Neurological:      Mental Status: She is alert.      Cranial Nerves: Cranial nerves are intact.      Motor: Motor function is intact. No abnormal muscle tone.      Coordination: Coordination is intact.      Gait: Gait is intact.      Deep Tendon Reflexes: Reflexes are normal and symmetric.   Psychiatric:         Mood and Affect: Mood normal.           PHQ-9 Depression Screening  Little interest or pleasure in doing things? 1   Feeling down, depressed, or hopeless? 1   Trouble falling or staying asleep, or sleeping too much? 0   Feeling tired or having little energy? 0   Poor appetite or overeating? 0   Feeling bad about yourself - or that you are a failure or have let yourself or your family down? 1   Trouble concentrating on things, such as reading the newspaper or watching television? 0   Moving or speaking so slowly that other people could have noticed? Or the opposite - being so fidgety or restless that you have been moving around a lot more than usual? 0   Thoughts that you would be better off dead, or of hurting yourself in some way? 0   PHQ-9 Total Score 3   If you checked off any problems, how difficult have these problems made it for you to do your work, take care of things at home, or get along with other people? Somewhat difficult     PHQ-9 Total Score: 3      Counseling was given to patient for the following topics:  appropriate exercise, healthy eating habits, disease prevention, risk factors for cancer, importance of self breast exam and breast health, importance of immunizations, including risks and benefits, bone health, sun safety, seatbelt use and safe driving. Also discussed the importance of regular dental and vision care, as well recommendation for a yearly screening skin exam after age 40.  Written information provided to patient on these topics and other health maintenance issues.      Assessment/Plan        Diagnoses and all orders for this visit:    1. Encounter for health maintenance examination in adult (Primary)  -     Magnesium  -     Comprehensive Metabolic Panel  -     CBC & Differential  -     TSH  -     CBC Auto Differential    2. Attention or concentration deficit   Continue current medication(s) as noted in the history of present illness.    The patient was instructed in the side effects of the medication.  Risks of the potential for tolerance, dependence, and addiction were discussed.  The patient was instructed to take the lowest dosage of the medication, at the lowest frequency, and for the shortest period of time possible.  The patient was instructed not to receive controlled substances or narcotics from other doctors, and not to giveaway or sell the medication.    The patient was instructed to abstain from illicit drug use.  The patient was instructed to avoid alcohol while taking these medications.      Narcotics/controlled substance agreement, Long report, and Urine Drug Screen were updated today if needed.    3. Gastroesophageal reflux disease, unspecified whether esophagitis present   Continue current medication(s) as noted in the history of present illness.   Recommended monitoring the right lower quadrant pain, as it was just noted by the patient today on exam.  If it persists, will order an ultrasound.      4. Osteopenia of multiple sites  -     DEXA Bone Density Axial; Future   Continue Calcium and Vitamin D supplementation, as well as weight bearing exercises.    5. Vitamin D deficiency   Continue Vitamin D supplementation.    6. Other depression   Continue current medication(s) as noted in the history of present illness.    7. Generalized anxiety disorder   Continue current medication(s) as noted in the history of present illness.    8. Snoring  -     Ambulatory Referral to Sleep Medicine    9. Screening for cardiovascular condition  -     Magnesium  -     Comprehensive Metabolic  Panel  -     CBC & Differential  -     TSH  -     CBC Auto Differential    10. High risk medication use  -     Magnesium  -     Urine Drug Screen - Urine, Clean Catch; Future      Recommended Shingrix (new Shingles vaccine) and Hepatitis A vaccination at the pharmacy, after the second Covid vaccine.    The patient states her Mammogram is scheduled for March 2021.    Recommend [facility].   Information was given today.    The patient declined scheduling a Cardiac CT scan today.      Return in about 3 months (around 4/22/2021) for Recheck ADD, non-fasting.

## 2021-01-23 LAB
ALBUMIN SERPL-MCNC: 4.5 G/DL (ref 3.5–5.2)
ALBUMIN/GLOB SERPL: 2.4 G/DL
ALP SERPL-CCNC: 64 U/L (ref 39–117)
ALT SERPL W P-5'-P-CCNC: 14 U/L (ref 1–33)
ANION GAP SERPL CALCULATED.3IONS-SCNC: 9.5 MMOL/L (ref 5–15)
AST SERPL-CCNC: 17 U/L (ref 1–32)
BILIRUB SERPL-MCNC: 0.4 MG/DL (ref 0–1.2)
BUN SERPL-MCNC: 22 MG/DL (ref 6–20)
BUN/CREAT SERPL: 26.5 (ref 7–25)
CALCIUM SPEC-SCNC: 9.4 MG/DL (ref 8.6–10.5)
CHLORIDE SERPL-SCNC: 102 MMOL/L (ref 98–107)
CO2 SERPL-SCNC: 27.5 MMOL/L (ref 22–29)
CREAT SERPL-MCNC: 0.83 MG/DL (ref 0.57–1)
GFR SERPL CREATININE-BSD FRML MDRD: 71 ML/MIN/1.73
GLOBULIN UR ELPH-MCNC: 1.9 GM/DL
GLUCOSE SERPL-MCNC: 97 MG/DL (ref 65–99)
MAGNESIUM SERPL-MCNC: 2.3 MG/DL (ref 1.6–2.6)
POTASSIUM SERPL-SCNC: 4.7 MMOL/L (ref 3.5–5.2)
PROT SERPL-MCNC: 6.4 G/DL (ref 6–8.5)
SODIUM SERPL-SCNC: 139 MMOL/L (ref 136–145)
TSH SERPL DL<=0.05 MIU/L-ACNC: 0.98 UIU/ML (ref 0.27–4.2)

## 2021-01-29 ENCOUNTER — HOSPITAL ENCOUNTER (OUTPATIENT)
Dept: ULTRASOUND IMAGING | Facility: HOSPITAL | Age: 61
Discharge: HOME | End: 2021-01-29
Admitting: INTERNAL MEDICINE

## 2021-01-29 DIAGNOSIS — R22.2 MASS ON BACK: ICD-10-CM

## 2021-01-29 PROCEDURE — 76604 US EXAM CHEST: CPT

## 2021-02-02 DIAGNOSIS — R22.2 MASS ON BACK: Primary | ICD-10-CM

## 2021-02-12 DIAGNOSIS — R41.840 ATTENTION OR CONCENTRATION DEFICIT: ICD-10-CM

## 2021-02-12 NOTE — TELEPHONE ENCOUNTER
Last Office Visit: 01/22/2021  Next Office Visit: 04/23/2021    Labs completed in past 6 months? yes  Labs completed in past year? yes    Last Refill Date: 01/11/2021   Quantity: 30  Refills: 0    Pharmacy: [facility] [address] [phone] [facility] [phone] FX

## 2021-02-15 RX ORDER — METHYLPHENIDATE HYDROCHLORIDE 36 MG/1
36 TABLET ORAL EVERY MORNING
Qty: 30 TABLET | Refills: 0 | Status: SHIPPED | OUTPATIENT
Start: 2021-02-15 | End: 2021-03-16 | Stop reason: SDUPTHER

## 2021-03-16 DIAGNOSIS — R41.840 ATTENTION OR CONCENTRATION DEFICIT: ICD-10-CM

## 2021-03-18 RX ORDER — METHYLPHENIDATE HYDROCHLORIDE 36 MG/1
36 TABLET ORAL EVERY MORNING
Qty: 30 TABLET | Refills: 0 | Status: SHIPPED | OUTPATIENT
Start: 2021-03-18 | End: 2021-04-15 | Stop reason: SDUPTHER

## 2021-03-19 ENCOUNTER — APPOINTMENT (OUTPATIENT)
Dept: MAMMOGRAPHY | Facility: HOSPITAL | Age: 61
End: 2021-03-19

## 2021-03-19 ENCOUNTER — APPOINTMENT (OUTPATIENT)
Dept: BONE DENSITY | Facility: HOSPITAL | Age: 61
End: 2021-03-19

## 2021-04-15 DIAGNOSIS — R41.840 ATTENTION OR CONCENTRATION DEFICIT: ICD-10-CM

## 2021-04-16 RX ORDER — METHYLPHENIDATE HYDROCHLORIDE 36 MG/1
36 TABLET ORAL EVERY MORNING
Qty: 30 TABLET | Refills: 0 | Status: SHIPPED | OUTPATIENT
Start: 2021-04-16 | End: 2021-05-14 | Stop reason: SDUPTHER

## 2021-04-23 ENCOUNTER — TELEMEDICINE (OUTPATIENT)
Dept: INTERNAL MEDICINE | Facility: CLINIC | Age: 61
End: 2021-04-23

## 2021-04-23 VITALS — BODY MASS INDEX: 25.15 KG/M2 | WEIGHT: 163 LBS

## 2021-04-23 DIAGNOSIS — R41.840 ATTENTION OR CONCENTRATION DEFICIT: Primary | ICD-10-CM

## 2021-04-23 PROCEDURE — 99213 OFFICE O/P EST LOW 20 MIN: CPT | Performed by: INTERNAL MEDICINE

## 2021-04-23 RX ORDER — CETIRIZINE HYDROCHLORIDE 10 MG/1
10 TABLET ORAL DAILY PRN
COMMUNITY

## 2021-04-23 NOTE — PROGRESS NOTES
Subjective       Jamee Wagner is a 60 y.o. female.     Chief Complaint   Patient presents with   • ADD     follow up       History obtained from the patient.    You have chosen to receive care through a telehealth visit.  Do you consent to use a video/audio connection for your medical care today? Yes      History of Present Illness     ADD Follow-Up: The patient is here for follow up of ADD, which has been stable.   Comorbid Illness: Anxiety and Depression.  Interval Events: She has been under increased stress.  Target Symptoms:   1. Hyperactive behavior is denied.   2. Impulsive behavior is worse.  3. Difficulty concentrating is stable.   Medications: Concerta.  Medication Side Effects: None.     The patient is taking the following controlled substance(s) on a long term (greater than three months) basis: Methylphenidate (Concerta/Ritalin).  She is taking controlled substances for other (ADD).  A history was obtained from the patient and the following were reviewed: family history, social history, psychiatric history, and substance abuse history.  A baseline assessment was performed and includes a controlled substance agreement, urine drug screen, KRISTY (within 12 months prior to today's encounter), and a physical exam, if appropriate, was performed within the past year.  It is medically appropriate to prescribe her the medication(s) above.  If applicable, prior treatment records were obtained and reviewed to justify long term prescribing of controlled substances.  The patient was educated on the following: Limited use of the medication, need to discontinue the medication when her condition resolves, proper storage and disposal of medication(s), and risks and dangers associated with controlled substances including tolerance, dependence, and addiction.  A written informed consent was obtained and includes objectives of treatment, further diagnostic testing if appropriate, and an exit strategy for discontinuing  the medication on the condition resolves, if appropriate.  In addition, if appropriate, the patient will be screened for other medical conditions that may impact the prescribing of controlled substances.  Previous treatments have been tried including trials of noncontrolled substances or lower doses of controlled substances.  The controlled medication(s) have been titrated to the level appropriate and necessary and the medication(s) are not causing unacceptable side effects.      Current Outpatient Medications on File Prior to Visit   Medication Sig Dispense Refill   • cetirizine (zyrTEC) 10 MG tablet Take 10 mg by mouth Daily As Needed.     • Cholecalciferol (VITAMIN D3 PO) Take 1,000 Units by mouth Daily.     • escitalopram (LEXAPRO) 10 MG tablet TAKE ONE TABLET BY MOUTH DAILY 30 tablet 11   • methylphenidate (Concerta) 36 MG CR tablet Take 1 tablet by mouth Every Morning 30 tablet 0   • Multiple Vitamins-Minerals (MULTIVITAMIN WITH MINERALS) tablet tablet Take 1 tablet by mouth Daily.     • omeprazole (PRILOSEC) 20 MG capsule Take 20 mg by mouth As Needed.     • traZODone (DESYREL) 50 MG tablet TAKE ONE TO TWO TABLETS BY MOUTH ONCE NIGHTLY AS NEEDED FOR SLEEP 60 tablet 11     No current facility-administered medications on file prior to visit.       Current outpatient and discharge medications have been reconciled for the patient.  Reviewed by: Jami Morales MD        The following portions of the patient's history were reviewed and updated as appropriate: allergies, current medications, past family history, past medical history, past social history, past surgical history and problem list.    Review of Systems   Constitutional: Negative for appetite change, fatigue and unexpected weight change.   Respiratory: Negative for shortness of breath.    Cardiovascular: Negative for chest pain and palpitations.   Gastrointestinal: Negative for abdominal pain, diarrhea, nausea and vomiting.   Neurological: Negative for  headaches.        No tics.  No memory loss.   Psychiatric/Behavioral: Positive for decreased concentration. Negative for agitation, confusion, sleep disturbance and suicidal ideas. The patient is not nervous/anxious.         No depression.         Objective       Weight 73.9 kg (163 lb), not currently breastfeeding.  Body mass index is 25.15 kg/m².      Physical Exam  Vitals reviewed.   Constitutional:       Appearance: She is normal weight.   Neurological:      Mental Status: She is alert.   Psychiatric:         Mood and Affect: Mood normal.         Assessment / Plan:  Diagnoses and all orders for this visit:    1. Attention or concentration deficit (Primary)     Continue current medication(s) as noted in the history of present illness.    The patient was instructed in the side effects of the medication.  Risks of the potential for tolerance, dependence, and addiction were discussed.  The patient was instructed to take the lowest dosage of the medication, at the lowest frequency, and for the shortest period of time possible.  The patient was instructed not to receive controlled substances or narcotics from other doctors, and not to giveaway or sell the medication.    The patient was instructed to abstain from illicit drug use.  The patient was instructed to avoid alcohol while taking these medications.      Narcotics/controlled substance agreement, Long report, and Urine Drug Screen were updated today if needed.          Return in about 3 months (around 7/23/2021) for Recheck ADD, in office, non-fasting.

## 2021-04-26 ENCOUNTER — TELEPHONE (OUTPATIENT)
Dept: INTERNAL MEDICINE | Facility: CLINIC | Age: 61
End: 2021-04-26

## 2021-04-26 NOTE — TELEPHONE ENCOUNTER
----- Message from Jami Morales MD sent at 4/23/2021  1:09 PM EDT -----  Please call patient to schedule her follow-up appointment.  LVM to call office and schedule 3 mth f/u appt Ok for [facility] to schedule appt non- fasting

## 2021-05-12 ENCOUNTER — HOSPITAL ENCOUNTER (OUTPATIENT)
Dept: MAMMOGRAPHY | Facility: HOSPITAL | Age: 61
Discharge: HOME | End: 2021-05-12
Admitting: INTERNAL MEDICINE

## 2021-05-12 DIAGNOSIS — Z12.31 VISIT FOR SCREENING MAMMOGRAM: ICD-10-CM

## 2021-05-12 PROCEDURE — 77067 SCR MAMMO BI INCL CAD: CPT

## 2021-05-12 PROCEDURE — 77067 SCR MAMMO BI INCL CAD: CPT | Performed by: RADIOLOGY

## 2021-05-12 PROCEDURE — 77063 BREAST TOMOSYNTHESIS BI: CPT | Performed by: RADIOLOGY

## 2021-05-12 PROCEDURE — 77063 BREAST TOMOSYNTHESIS BI: CPT

## 2021-05-14 DIAGNOSIS — R41.840 ATTENTION OR CONCENTRATION DEFICIT: ICD-10-CM

## 2021-05-18 DIAGNOSIS — R41.840 ATTENTION OR CONCENTRATION DEFICIT: ICD-10-CM

## 2021-05-19 RX ORDER — METHYLPHENIDATE HYDROCHLORIDE 36 MG/1
36 TABLET ORAL EVERY MORNING
Qty: 30 TABLET | Refills: 0 | Status: SHIPPED | OUTPATIENT
Start: 2021-05-19 | End: 2021-06-16 | Stop reason: SDUPTHER

## 2021-05-19 RX ORDER — METHYLPHENIDATE HYDROCHLORIDE 36 MG/1
36 TABLET ORAL EVERY MORNING
Qty: 30 TABLET | Refills: 0 | OUTPATIENT
Start: 2021-05-19

## 2021-05-19 NOTE — TELEPHONE ENCOUNTER
Last OV for chronic conditions  04/23/2021  Next OV none scheduled   UDS 01/22/2021  CSA 01/22/2021

## 2021-06-02 ENCOUNTER — HOSPITAL ENCOUNTER (OUTPATIENT)
Dept: MAMMOGRAPHY | Facility: HOSPITAL | Age: 61
Discharge: HOME | End: 2021-06-02
Admitting: RADIOLOGY

## 2021-06-02 DIAGNOSIS — R92.8 ABNORMAL MAMMOGRAM: ICD-10-CM

## 2021-06-02 PROCEDURE — 77065 DX MAMMO INCL CAD UNI: CPT | Performed by: RADIOLOGY

## 2021-06-02 PROCEDURE — 77061 BREAST TOMOSYNTHESIS UNI: CPT | Performed by: RADIOLOGY

## 2021-06-02 PROCEDURE — 77065 DX MAMMO INCL CAD UNI: CPT

## 2021-06-02 PROCEDURE — G0279 TOMOSYNTHESIS, MAMMO: HCPCS

## 2021-06-16 DIAGNOSIS — R41.840 ATTENTION OR CONCENTRATION DEFICIT: ICD-10-CM

## 2021-06-17 RX ORDER — METHYLPHENIDATE HYDROCHLORIDE 36 MG/1
36 TABLET ORAL EVERY MORNING
Qty: 30 TABLET | Refills: 0 | Status: SHIPPED | OUTPATIENT
Start: 2021-06-17 | End: 2021-07-14 | Stop reason: SDUPTHER

## 2021-07-14 DIAGNOSIS — R41.840 ATTENTION OR CONCENTRATION DEFICIT: ICD-10-CM

## 2021-07-16 RX ORDER — METHYLPHENIDATE HYDROCHLORIDE 36 MG/1
36 TABLET ORAL EVERY MORNING
Qty: 30 TABLET | Refills: 0 | Status: SHIPPED | OUTPATIENT
Start: 2021-07-16 | End: 2021-08-17 | Stop reason: SDUPTHER

## 2021-07-21 ENCOUNTER — OFFICE VISIT (OUTPATIENT)
Dept: INTERNAL MEDICINE | Facility: CLINIC | Age: 61
End: 2021-07-21

## 2021-07-21 VITALS
SYSTOLIC BLOOD PRESSURE: 128 MMHG | WEIGHT: 162 LBS | TEMPERATURE: 98 F | RESPIRATION RATE: 19 BRPM | BODY MASS INDEX: 25 KG/M2 | HEART RATE: 70 BPM | DIASTOLIC BLOOD PRESSURE: 68 MMHG

## 2021-07-21 DIAGNOSIS — J32.0 LEFT MAXILLARY SINUSITIS: ICD-10-CM

## 2021-07-21 DIAGNOSIS — R41.840 ATTENTION OR CONCENTRATION DEFICIT: Primary | ICD-10-CM

## 2021-07-21 PROCEDURE — 99213 OFFICE O/P EST LOW 20 MIN: CPT | Performed by: INTERNAL MEDICINE

## 2021-07-21 RX ORDER — AMOXICILLIN 875 MG/1
875 TABLET, COATED ORAL 2 TIMES DAILY
Qty: 42 TABLET | Refills: 0 | Status: SHIPPED | OUTPATIENT
Start: 2021-07-21 | End: 2021-08-11

## 2021-07-21 NOTE — PROGRESS NOTES
Subjective       Jamee Wagner is a 60 y.o. female.     Chief Complaint   Patient presents with   • Attention or concentration deficit       History obtained from the patient.      History of Present Illness     The patient is complaining of left maxillary sinus tenderness since approximately November 2020.  She describes the pain as a dull ache.  She has some postnasal drainage and some pain in the left temple area intermittently.  No other URI/allergy symptoms.  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  Sudafed and ibuprofen help, as do warm compresses.  She is also tried nasal saline rinse.    ADD Follow-Up: The patient is here for follow up of ADD, which has been stable.   Comorbid Illness: Anxiety and Depression.  Interval Events: She has been under increased stress.  Target Symptoms:   1. Hyperactive behavior is denied.   2. Impulsive behavior is worse.  3. Difficulty concentrating is stable.   Medications: Concerta.  Medication Side Effects: None.     The patient is taking the following controlled substance(s) on a long term (greater than three months) basis: Methylphenidate (Concerta/Ritalin).  She is taking controlled substances for other (ADD).  A history was obtained from the patient and the following were reviewed: family history, social history, psychiatric history, and substance abuse history.  A baseline assessment was performed and includes a controlled substance agreement, urine drug screen, KRISTY (within 12 months prior to today's encounter), and a physical exam, if appropriate, was performed within the past year.  It is medically appropriate to prescribe her the medication(s) above.  If applicable, prior treatment records were obtained and reviewed to justify long term prescribing of controlled substances.  The patient was educated on the following: Limited use of the medication, need to discontinue the medication when her condition  resolves, proper storage and disposal of medication(s), and risks and dangers associated with controlled substances including tolerance, dependence, and addiction.  A written informed consent was obtained and includes objectives of treatment, further diagnostic testing if appropriate, and an exit strategy for discontinuing the medication on the condition resolves, if appropriate.  In addition, if appropriate, the patient will be screened for other medical conditions that may impact the prescribing of controlled substances.  Previous treatments have been tried including trials of noncontrolled substances or lower doses of controlled substances.  The controlled medication(s) have been titrated to the level appropriate and necessary and the medication(s) are not causing unacceptable side effects.      Current Outpatient Medications on File Prior to Visit   Medication Sig Dispense Refill   • cetirizine (zyrTEC) 10 MG tablet Take 10 mg by mouth Daily As Needed.     • Cholecalciferol (VITAMIN D3 PO) Take 1,000 Units by mouth Daily.     • escitalopram (LEXAPRO) 10 MG tablet TAKE ONE TABLET BY MOUTH DAILY 30 tablet 11   • methylphenidate (Concerta) 36 MG CR tablet Take 1 tablet by mouth Every Morning 30 tablet 0   • Multiple Vitamins-Minerals (MULTIVITAMIN WITH MINERALS) tablet tablet Take 1 tablet by mouth Daily.     • omeprazole (PRILOSEC) 20 MG capsule Take 20 mg by mouth As Needed.     • traZODone (DESYREL) 50 MG tablet TAKE ONE TO TWO TABLETS BY MOUTH ONCE NIGHTLY AS NEEDED FOR SLEEP 60 tablet 11     No current facility-administered medications on file prior to visit.       Current outpatient and discharge medications have been reconciled for the patient.  Reviewed by: Jami Morales MD        The following portions of the patient's history were reviewed and updated as appropriate: allergies, current medications, past family history, past medical history, past social history, past surgical history and problem  list.    Review of Systems   Constitutional: Negative for appetite change, chills, fatigue, fever and unexpected weight change.   HENT: Positive for postnasal drip and sinus pain. Negative for congestion, ear pain and rhinorrhea.    Respiratory: Negative for shortness of breath and wheezing.    Cardiovascular: Negative for chest pain and palpitations.   Gastrointestinal: Negative for abdominal pain, diarrhea, nausea and vomiting.   Neurological: Positive for headaches.        No tics.  No memory loss.   Psychiatric/Behavioral: Positive for decreased concentration. Negative for agitation, confusion, sleep disturbance and suicidal ideas. The patient is not nervous/anxious.         No depression.         Objective       Blood pressure 128/68, pulse 70, temperature 98 °F (36.7 °C), resp. rate 19, weight 73.5 kg (162 lb), not currently breastfeeding.  Body mass index is 25 kg/m².      Physical Exam  Vitals and nursing note reviewed.   Constitutional:       Appearance: She is well-developed and normal weight.   HENT:      Head: Normocephalic and atraumatic.      Comments: No maxillary or frontal sinus tenderness to palpation.     Right Ear: Tympanic membrane, ear canal and external ear normal.      Left Ear: Tympanic membrane, ear canal and external ear normal.      Mouth/Throat:      Mouth: Mucous membranes are moist. No oral lesions.      Pharynx: Oropharynx is clear.      Comments: Tonsils normal.  Eyes:      Conjunctiva/sclera: Conjunctivae normal.   Cardiovascular:      Rate and Rhythm: Normal rate and regular rhythm.      Heart sounds: No murmur heard.     Pulmonary:      Effort: Pulmonary effort is normal.      Breath sounds: Normal breath sounds.   Musculoskeletal:      Cervical back: Normal range of motion and neck supple.   Lymphadenopathy:      Cervical: No cervical adenopathy.   Skin:     Findings: No rash.   Neurological:      Mental Status: She is alert.   Psychiatric:         Mood and Affect: Mood normal.          Assessment / Plan:  Diagnoses and all orders for this visit:    1. Attention or concentration deficit (Primary)   Continue current medication(s) as noted in the history of present illness.     The patient was instructed in the side effects of the medication.  Risks of the potential for tolerance, dependence, and addiction were discussed.  The patient was instructed to take the lowest dosage of the medication, at the lowest frequency, and for the shortest period of time possible.  The patient was instructed not to receive controlled substances or narcotics from other doctors, and not to giveaway or sell the medication.    The patient was instructed to abstain from illicit drug use.  The patient was instructed to avoid alcohol while taking these medications.      Narcotics/controlled substance agreement, Long report, and Urine Drug Screen were updated today if needed.    2. Left maxillary sinusitis  -     amoxicillin (AMOXIL) 875 MG tablet; Take 1 tablet by mouth 2 (Two) Times a Day for 21 days.  Dispense: 42 tablet; Refill: 0   The patient declines scheduling aCT of her sinuses due to lack of insurance coverage.  She agrees to call in 3 weeks with an update on the sinus pain.        Return in about 3 months (around 10/21/2021) for Recheck ADD, non-fasting.

## 2021-07-28 ENCOUNTER — APPOINTMENT (OUTPATIENT)
Dept: BONE DENSITY | Facility: HOSPITAL | Age: 61
End: 2021-07-28

## 2021-08-17 DIAGNOSIS — R41.840 ATTENTION OR CONCENTRATION DEFICIT: ICD-10-CM

## 2021-08-18 RX ORDER — METHYLPHENIDATE HYDROCHLORIDE 36 MG/1
36 TABLET ORAL EVERY MORNING
Qty: 30 TABLET | Refills: 0 | Status: SHIPPED | OUTPATIENT
Start: 2021-08-18 | End: 2022-07-08

## 2022-01-01 DIAGNOSIS — F32.89 OTHER DEPRESSION: ICD-10-CM

## 2022-01-03 RX ORDER — ESCITALOPRAM OXALATE 10 MG/1
TABLET ORAL
Qty: 30 TABLET | Refills: 11 | Status: SHIPPED | OUTPATIENT
Start: 2022-01-03 | End: 2022-07-19 | Stop reason: SDUPTHER

## 2022-07-08 ENCOUNTER — OFFICE VISIT (OUTPATIENT)
Dept: INTERNAL MEDICINE | Facility: CLINIC | Age: 62
End: 2022-07-08

## 2022-07-08 VITALS
RESPIRATION RATE: 20 BRPM | HEIGHT: 68 IN | BODY MASS INDEX: 26.52 KG/M2 | SYSTOLIC BLOOD PRESSURE: 120 MMHG | DIASTOLIC BLOOD PRESSURE: 80 MMHG | OXYGEN SATURATION: 99 % | TEMPERATURE: 97.3 F | WEIGHT: 175 LBS | HEART RATE: 84 BPM

## 2022-07-08 DIAGNOSIS — Z23 NEED FOR COVID-19 VACCINE: ICD-10-CM

## 2022-07-08 DIAGNOSIS — R41.840 ATTENTION OR CONCENTRATION DEFICIT: ICD-10-CM

## 2022-07-08 DIAGNOSIS — R10.12 LEFT UPPER QUADRANT ABDOMINAL PAIN: ICD-10-CM

## 2022-07-08 DIAGNOSIS — M85.89 OSTEOPENIA OF MULTIPLE SITES: ICD-10-CM

## 2022-07-08 DIAGNOSIS — Z12.31 ENCOUNTER FOR SCREENING MAMMOGRAM FOR BREAST CANCER: ICD-10-CM

## 2022-07-08 DIAGNOSIS — Z01.419 ENCOUNTER FOR CERVICAL PAP SMEAR WITH PELVIC EXAM: ICD-10-CM

## 2022-07-08 DIAGNOSIS — F32.89 OTHER DEPRESSION: ICD-10-CM

## 2022-07-08 DIAGNOSIS — Z13.6 SCREENING FOR CARDIOVASCULAR CONDITION: ICD-10-CM

## 2022-07-08 DIAGNOSIS — R10.9 FLANK PAIN: ICD-10-CM

## 2022-07-08 DIAGNOSIS — Z79.899 HIGH RISK MEDICATION USE: ICD-10-CM

## 2022-07-08 DIAGNOSIS — F41.1 GENERALIZED ANXIETY DISORDER: ICD-10-CM

## 2022-07-08 DIAGNOSIS — F51.01 PRIMARY INSOMNIA: ICD-10-CM

## 2022-07-08 DIAGNOSIS — K21.9 GASTROESOPHAGEAL REFLUX DISEASE, UNSPECIFIED WHETHER ESOPHAGITIS PRESENT: ICD-10-CM

## 2022-07-08 DIAGNOSIS — Z00.00 ENCOUNTER FOR HEALTH MAINTENANCE EXAMINATION IN ADULT: Primary | ICD-10-CM

## 2022-07-08 DIAGNOSIS — E55.9 VITAMIN D DEFICIENCY: ICD-10-CM

## 2022-07-08 LAB
25(OH)D3 SERPL-MCNC: 46.3 NG/ML (ref 30–100)
ALBUMIN SERPL-MCNC: 4.2 G/DL (ref 3.5–5.2)
ALBUMIN/GLOB SERPL: 1.6 G/DL
ALP SERPL-CCNC: 81 U/L (ref 39–117)
ALT SERPL W P-5'-P-CCNC: 19 U/L (ref 1–33)
ANION GAP SERPL CALCULATED.3IONS-SCNC: 8.7 MMOL/L (ref 5–15)
AST SERPL-CCNC: 20 U/L (ref 1–32)
BASOPHILS # BLD AUTO: 0.05 10*3/MM3 (ref 0–0.2)
BASOPHILS NFR BLD AUTO: 1.1 % (ref 0–1.5)
BILIRUB BLD-MCNC: NEGATIVE MG/DL
BILIRUB SERPL-MCNC: 0.5 MG/DL (ref 0–1.2)
BUN SERPL-MCNC: 11 MG/DL (ref 6–20)
BUN/CREAT SERPL: 13.9 (ref 7–25)
CALCIUM SPEC-SCNC: 9.4 MG/DL (ref 8.6–10.5)
CHLORIDE SERPL-SCNC: 103 MMOL/L (ref 98–107)
CHOLEST SERPL-MCNC: 212 MG/DL (ref 0–200)
CLARITY, POC: CLEAR
CO2 SERPL-SCNC: 27.3 MMOL/L (ref 22–29)
COLOR UR: YELLOW
CREAT SERPL-MCNC: 0.79 MG/DL (ref 0.57–1)
DEPRECATED RDW RBC AUTO: 40.3 FL (ref 37–54)
EGFRCR SERPLBLD CKD-EPI 2021: 86.3 ML/MIN/1.73
EOSINOPHIL # BLD AUTO: 0.09 10*3/MM3 (ref 0–0.4)
EOSINOPHIL NFR BLD AUTO: 2 % (ref 0.3–6.2)
ERYTHROCYTE [DISTWIDTH] IN BLOOD BY AUTOMATED COUNT: 11.6 % (ref 12.3–15.4)
EXPIRATION DATE: NORMAL
GLOBULIN UR ELPH-MCNC: 2.7 GM/DL
GLUCOSE SERPL-MCNC: 100 MG/DL (ref 65–99)
GLUCOSE UR STRIP-MCNC: NEGATIVE MG/DL
HCT VFR BLD AUTO: 40.9 % (ref 34–46.6)
HDLC SERPL-MCNC: 75 MG/DL (ref 40–60)
HGB BLD-MCNC: 14.2 G/DL (ref 12–15.9)
IMM GRANULOCYTES # BLD AUTO: 0.01 10*3/MM3 (ref 0–0.05)
IMM GRANULOCYTES NFR BLD AUTO: 0.2 % (ref 0–0.5)
KETONES UR QL: NEGATIVE
LDLC SERPL CALC-MCNC: 120 MG/DL (ref 0–100)
LDLC/HDLC SERPL: 1.56 {RATIO}
LEUKOCYTE EST, POC: NEGATIVE
LYMPHOCYTES # BLD AUTO: 1.85 10*3/MM3 (ref 0.7–3.1)
LYMPHOCYTES NFR BLD AUTO: 42 % (ref 19.6–45.3)
Lab: NORMAL
MAGNESIUM SERPL-MCNC: 2.3 MG/DL (ref 1.6–2.6)
MCH RBC QN AUTO: 33.5 PG (ref 26.6–33)
MCHC RBC AUTO-ENTMCNC: 34.7 G/DL (ref 31.5–35.7)
MCV RBC AUTO: 96.5 FL (ref 79–97)
MONOCYTES # BLD AUTO: 0.3 10*3/MM3 (ref 0.1–0.9)
MONOCYTES NFR BLD AUTO: 6.8 % (ref 5–12)
NEUTROPHILS NFR BLD AUTO: 2.1 10*3/MM3 (ref 1.7–7)
NEUTROPHILS NFR BLD AUTO: 47.9 % (ref 42.7–76)
NITRITE UR-MCNC: NEGATIVE MG/ML
NRBC BLD AUTO-RTO: 0.2 /100 WBC (ref 0–0.2)
PH UR: 8 [PH] (ref 5–8)
PLATELET # BLD AUTO: 249 10*3/MM3 (ref 140–450)
PMV BLD AUTO: 10 FL (ref 6–12)
POTASSIUM SERPL-SCNC: 4.3 MMOL/L (ref 3.5–5.2)
PROT SERPL-MCNC: 6.9 G/DL (ref 6–8.5)
PROT UR STRIP-MCNC: NEGATIVE MG/DL
RBC # BLD AUTO: 4.24 10*6/MM3 (ref 3.77–5.28)
RBC # UR STRIP: NEGATIVE /UL
SODIUM SERPL-SCNC: 139 MMOL/L (ref 136–145)
SP GR UR: 1.01 (ref 1–1.03)
TRIGL SERPL-MCNC: 99 MG/DL (ref 0–150)
UROBILINOGEN UR QL: NORMAL
VLDLC SERPL-MCNC: 17 MG/DL (ref 5–40)
WBC NRBC COR # BLD: 4.4 10*3/MM3 (ref 3.4–10.8)

## 2022-07-08 PROCEDURE — 83735 ASSAY OF MAGNESIUM: CPT | Performed by: INTERNAL MEDICINE

## 2022-07-08 PROCEDURE — 91305 COVID-19 (PFIZER) 12+ YRS: CPT | Performed by: INTERNAL MEDICINE

## 2022-07-08 PROCEDURE — 99396 PREV VISIT EST AGE 40-64: CPT | Performed by: INTERNAL MEDICINE

## 2022-07-08 PROCEDURE — 82306 VITAMIN D 25 HYDROXY: CPT | Performed by: INTERNAL MEDICINE

## 2022-07-08 PROCEDURE — 81003 URINALYSIS AUTO W/O SCOPE: CPT | Performed by: INTERNAL MEDICINE

## 2022-07-08 PROCEDURE — 80050 GENERAL HEALTH PANEL: CPT | Performed by: INTERNAL MEDICINE

## 2022-07-08 PROCEDURE — 80061 LIPID PANEL: CPT | Performed by: INTERNAL MEDICINE

## 2022-07-08 PROCEDURE — 0054A COVID-19 (PFIZER) 12+ YRS: CPT | Performed by: INTERNAL MEDICINE

## 2022-07-08 RX ORDER — TRAZODONE HYDROCHLORIDE 50 MG/1
50-100 TABLET ORAL NIGHTLY PRN
Qty: 60 TABLET | Refills: 11 | Status: SHIPPED | OUTPATIENT
Start: 2022-07-08

## 2022-07-08 NOTE — PROGRESS NOTES
Subjective     Chief Complaint:  Physical Exam.    History of Present Illness    History obtained from the patient.    ADD Follow-Up: The patient is here for follow up of ADD, which has been stable.   Comorbid Illness: Anxiety and Depression.  Interval Events: She has been off Concerta for several months..  Target Symptoms:   1. Hyperactive behavior and restlessness are denied.   2. Impulsive behavior is denying  3. Difficulty concentrating is stable.  No memory loss.  Medications: Concerta.  Medication Side Effects: None.     GERD  Follow-up: The patient is being seen for a routine clinic follow-up of Gastroesophageal Reflux Disease, which is unstable.   Procedures: EGD on 8/28/2017 showed a gastric hyperplastic polyp, otherwise normal.  Interval Events: She is under a lot of stress with her job.  Symptoms: She is complaining of sharp (4 on a scale of 10) left-sided mid abdominal pain intermittently for the past several months.  It has been increasing in frequency and has been constant for the last 5 days.  An achy pain radiates to the left flank.  Her heartburn (in back) and acid reflux have been worse for the past 10 days.   She reports occasional BRBPR and fecal incontinence.  Appetite is decreased.   No nausea, vomiting, dysphagia, odynophagia, hematemesis,  melena,  belching, bloating, or early satiety.   Associated Symptoms:  No chronic sore throat, cough, hoarseness, or wheezing.   Medication: Omeprazole daily.  She has not taken any additional medications for the abdominal pain.     Osteopenia Follow-up: The patient is being seen for routine follow-up of Osteopenia, which is stable.   Interval Events: The last DEXA scan was done 8/17/17, Osteopenia L1-L4, left total hip, and bilateral femoral necks.   Symptoms:   Denies myalgias, arthralgias, back pain, neck pain, hip pain, hand pain, paresthesias, loss of balance, and gait instability.    Associated Symptoms: No new  fracture.   Medication: Calcium and Vitamin D supplements.     Vitamin D Deficiency Follow-Up: The patient is here for follow-up of Vitamin D Deficiency, which is stable.    Interval Events: Last Vitamin D level on 7/17/2020 was 28.1.    Symptoms: Denies fatigue, myalgias, arthralgias, paresthesias, loss of balance, and gait instability.    Medication: Vitamin D3 supplement.     Depression and Anxiety Disorder Follow-Up: The patient is being seen for follow-up of Depression and Anxiety, which is slightly worse  Comorbid Illnesses:  Insomnia.  Interval Events:  She is under a lot of stress with her job.  Symptoms: Worsened depression and anxiety, but stable insomnia. Stable concentration issues, but no memory loss. She denies panic attacks and anhedonia.    Associated Symptoms: The patient denies thoughts of suicide and thoughts of self-harm.   Social Support: the patient has good social support.   Medications:  Escitalopram.   Side Effects: None.    Jamee Wagner is a 61 y.o. female who presents for an Annual Physical.      The patient presents for a Pap Smear. She is G [1 ] P [2 ]. She is menopausal. Her last menstrual cycle was at age 47. She has not had any HRT. She [denies] dyspareunia. She [is] sexually active. Her last Pap smear was [ 12/23/19, normal ]. She [denies] abnormal Pap Smears in the past. She [denies] sexually-transmitted diseases and HPV in the past. She [denies] KYLIE Exposure in utero.      She [does] do self breast exam. She [denies) breast mass, breast pain, or nipple discharge. Last Mammogram was [6/2/21, cat 2 ]. Last DEXA Scan was [ 8/17/17, Osteopenia L1-L4, left total hip, and bilateral femoral necks ]. Last Colonoscopy was [7/29/14, normal ].     She [takes] a daily Multiviatmin and [takes] daily Calcium / Vitamin D.      Her PGM had Colon Cancer.  She [denies] a family history of  Colon Polyps, Breast Cancer, Uterine Cancer, Cervical Cancer, or Ovarian Cancer.       PMH, PSH, SocHx,  FamHx, Allergies, and Medications: Reviewed and updated.    Outpatient Medications Prior to Visit   Medication Sig Dispense Refill   • cetirizine (zyrTEC) 10 MG tablet Take 10 mg by mouth Daily As Needed.     • Cholecalciferol (VITAMIN D3 PO) Take 1,000 Units by mouth Daily.     • escitalopram (LEXAPRO) 10 MG tablet TAKE ONE TABLET BY MOUTH DAILY 30 tablet 11   • Multiple Vitamins-Minerals (MULTIVITAMIN WITH MINERALS) tablet tablet Take 1 tablet by mouth Daily.     • omeprazole (priLOSEC) 20 MG capsule Take 20 mg by mouth As Needed.     • traZODone (DESYREL) 50 MG tablet TAKE ONE TO TWO TABLETS BY MOUTH ONCE NIGHTLY AS NEEDED FOR SLEEP 60 tablet 11   • methylphenidate (Concerta) 36 MG CR tablet Take 1 tablet by mouth Every Morning 30 tablet 0     No facility-administered medications prior to visit.       Immunization History   Administered Date(s) Administered   • COVID-19 (MODERNA) 1st, 2nd, 3rd Dose Only 01/04/2021, 02/01/2021   • COVID-19 (MODERNA) BOOSTER 12/23/2021   • Covid-19 (Pfizer) Gray Cap 07/08/2022   • Flu Vaccine Quad PF >36MO 10/30/2017   • FluLaval/Fluarix/Fluzone >6 11/19/2018, 12/23/2019, 10/17/2020   • Pneumococcal Conjugate 13-Valent (PCV13) 11/18/2015   • Td, Not Adsorbed 07/26/2017   • Tdap 01/22/2007   • Zostavax 11/18/2015         Patient Active Problem List   Diagnosis   • Macular degeneration   • Attention or concentration deficit   • Basal cell carcinoma of skin   • Depression   • Gastroesophageal reflux disease   • Menopause present   • Osteopenia   • Vitamin D deficiency   • Generalized anxiety disorder       Health Habits:  Dental Exam. up to date  Eye Exam. up to date  Hearing Loss:  No  Exercise: 7 times/week.  Current exercise activities include: walking  Diet: Healthy  Multivitamin: Yes    Safe Driving:  Yes  Seat Belt:  Yes  Bike Helmet:  N/A  Skin Screening:  Yes  Sunscreen: Yes  SBE / HALEIGH: Yes  Sexual Activity:  Yes  Birth Control:  Menopause  STD Prevention:  N/A    Last Pap:  12/23/19, normal.  Last Mammogram: 5/12/2021, category 0.  6/2/2021, category 2 left diagnostic mammogram.  Last DEXA Scan: 8/17/2017, Osteopenia L1-L4, left total hip, and bilateral femoral necks.   Last Colonoscopy: 7/29/2014, normal.  Last PSA: N/A    Social:    Social History     Socioeconomic History   • Marital status:    • Number of children: 2   Tobacco Use   • Smoking status: Never Smoker   • Smokeless tobacco: Never Used   Vaping Use   • Vaping Use: Never used   Substance and Sexual Activity   • Alcohol use: Yes     Alcohol/week: 2.0 standard drinks     Types: 2 Cans of beer per week     Comment: 2-3 beers per week   • Drug use: No   • Sexual activity: Yes     Partners: Male     Birth control/protection: Post-menopausal         Current Medical Providers:    Jami Moarles MD (Internal Medicine / Pediatrics)    The [facility] providers who are involved in the care of this patient are listed above.         Review of Systems   Constitutional: Positive for appetite change and unexpected weight change (weight up 13 pounds in 1 year). Negative for chills, fatigue and fever.        No night sweats.    HENT: Positive for postnasal drip. Negative for congestion, ear pain, hearing loss, nosebleeds, rhinorrhea, sinus pressure, sinus pain, sneezing, sore throat, tinnitus and voice change.         Denies snoring.   Eyes: Positive for itching. Negative for photophobia, pain, discharge, redness and visual disturbance.   Respiratory: Negative for cough, chest tightness, shortness of breath, wheezing and stridor.         No chest congestion.  No hemoptysis.   Cardiovascular: Negative for chest pain, palpitations and leg swelling.        No orthopnea, LAZARO, or PND.  No claudication or syncope.   Gastrointestinal: Positive for abdominal pain and blood in stool. Negative for constipation, diarrhea, nausea, rectal pain and vomiting.        Worsening heartburn and acid reflux.  No melena.  No hematemesis.  No  "dysphagia or odynophagia.  No early satiety, belching, or bloating.    Endocrine: Negative for cold intolerance, heat intolerance, polydipsia, polyphagia and polyuria.        No hair loss or dry skin.  No hot flashes.     Genitourinary: Positive for frequency. Negative for difficulty urinating, dyspareunia, dysuria, flank pain, hematuria, pelvic pain, urgency, vaginal bleeding and vaginal discharge.        No nocturia, incomplete emptying, or incontinence.   Musculoskeletal: Negative for arthralgias, back pain, gait problem, joint swelling, myalgias, neck pain and neck stiffness.        No joint stiffness.   Skin: Negative for rash.        No new skin lesions or changes in skin lesions. No breast pain or masses.  No nipple discharge or nipple inversion.   Neurological: Negative for dizziness, tremors, syncope, speech difficulty, weakness, light-headedness, numbness and headaches.        No tingling.  No memory loss.    Hematological: Negative for adenopathy. Does not bruise/bleed easily.   Psychiatric/Behavioral: Positive for decreased concentration and sleep disturbance. Negative for confusion, self-injury and suicidal ideas. The patient is nervous/anxious.         Stable depression.           Objective     Vitals:    07/08/22 1136   BP: 120/80   BP Location: Right arm   Patient Position: Sitting   Cuff Size: Adult   Pulse: 84   Resp: 20   Temp: 97.3 °F (36.3 °C)   TempSrc: Temporal   SpO2: 99%   Weight: 79.4 kg (175 lb)   Height: 172.7 cm (68\")   PainSc:   2   PainLoc: Abdomen       Body mass index is 26.61 kg/m².    Physical Exam  Vitals and nursing note reviewed. Exam conducted with a chaperone present.   Constitutional:       Appearance: Normal appearance. She is well-developed.      Comments: Overweight   HENT:      Head: Normocephalic and atraumatic.      Right Ear: Tympanic membrane, ear canal and external ear normal.      Left Ear: Tympanic membrane, ear canal and external ear normal.      Mouth/Throat:     "  Mouth: Mucous membranes are moist. No oral lesions.      Pharynx: Oropharynx is clear. No oropharyngeal exudate or posterior oropharyngeal erythema.      Comments: Tonsils normal.  Eyes:      General: Lids are normal.      Extraocular Movements: Extraocular movements intact.      Conjunctiva/sclera: Conjunctivae normal.      Pupils: Pupils are equal, round, and reactive to light.   Neck:      Thyroid: No thyroid mass or thyromegaly.      Vascular: No carotid bruit.   Cardiovascular:      Rate and Rhythm: Normal rate and regular rhythm.      Pulses: Normal pulses.      Heart sounds: No murmur heard.    No friction rub. No gallop.   Pulmonary:      Effort: Pulmonary effort is normal.      Breath sounds: Normal breath sounds.   Chest:   Breasts:      Right: No inverted nipple, mass, nipple discharge, skin change, tenderness, axillary adenopathy or supraclavicular adenopathy.      Left: No inverted nipple, mass, nipple discharge, skin change, tenderness, axillary adenopathy or supraclavicular adenopathy.       Abdominal:      General: Bowel sounds are normal. There is no distension or abdominal bruit.      Palpations: Abdomen is soft. There is no hepatomegaly, splenomegaly or mass.      Tenderness: There is abdominal tenderness (mild RUQ, mod LUQ, and mild epigastric). There is no right CVA tenderness, left CVA tenderness, guarding or rebound.      Comments: Rectal exam deferred.   Genitourinary:     Exam position: Prone.      Labia:         Right: No rash or lesion.         Left: No rash or lesion.       Vagina: Normal. No vaginal discharge.      Cervix: No cervical motion tenderness, discharge or friability.      Uterus: Normal. Not tender.       Adnexa:         Right: No mass or tenderness.          Left: No mass or tenderness.        Comments: Cervix is without lesions or erythema.  No uterine masses.  Musculoskeletal:         General: No tenderness. Normal range of motion.      Cervical back: Normal range of  motion and neck supple.      Right lower leg: No edema.      Left lower leg: No edema.   Lymphadenopathy:      Cervical: No cervical adenopathy.      Upper Body:      Right upper body: No supraclavicular or axillary adenopathy.      Left upper body: No supraclavicular or axillary adenopathy.      Lower Body: No right inguinal adenopathy. No left inguinal adenopathy.   Skin:     Findings: No lesion or rash.      Comments: No atypical skin lesions.   Neurological:      Mental Status: She is alert.      Cranial Nerves: Cranial nerves are intact.      Motor: Motor function is intact. No abnormal muscle tone.      Coordination: Coordination is intact.      Gait: Gait normal.      Deep Tendon Reflexes: Reflexes are normal and symmetric.   Psychiatric:         Mood and Affect: Mood normal.           PHQ-9 Depression Screening  Little interest or pleasure in doing things? 1-->several days   Feeling down, depressed, or hopeless? 1-->several days   Trouble falling or staying asleep, or sleeping too much? 2-->more than half the days (staying asleep)   Feeling tired or having little energy? 0-->not at all   Poor appetite or overeating? 1-->several days (poor appetite)   Feeling bad about yourself - or that you are a failure or have let yourself or your family down? 0-->not at all   Trouble concentrating on things, such as reading the newspaper or watching television? 3-->nearly every day   Moving or speaking so slowly that other people could have noticed? Or the opposite - being so fidgety or restless that you have been moving around a lot more than usual? 0-->not at all   Thoughts that you would be better off dead, or of hurting yourself in some way? 0-->not at all   PHQ-9 Total Score 8   If you checked off any problems, how difficult have these problems made it for you to do your work, take care of things at home, or get along with other people? somewhat difficult     PHQ-9 Total Score: 8      Counseling was given to patient  for the following topics: appropriate exercise, healthy eating habits, disease prevention, risk factors for cancer, importance of self breast exam and breast health, importance of immunizations, including risks and benefits, bone health, sun safety, seatbelt use and safe driving. Also discussed the importance of regular dental and vision care, as well recommendation for a yearly screening skin exam after age 40.  Written information provided to patient on these topics and other health maintenance issues.    Results for orders placed or performed in visit on 07/08/22   POC Urinalysis Dipstick, Automated    Specimen: Urine   Result Value Ref Range    Color Yellow Yellow, Straw, Dark Yellow, [place]    Clarity, UA Clear Clear    Specific Gravity  1.010 1.005 - 1.030    pH, Urine 8.0 5.0 - 8.0    Leukocytes Negative Negative    Nitrite, UA Negative Negative    Protein, POC Negative Negative mg/dL    Glucose, UA Negative Negative mg/dL    Ketones, UA Negative Negative    Urobilinogen, UA Normal Normal    Bilirubin Negative Negative    Blood, UA Negative Negative    Lot Number [id number]     Expiration Date 02/28/2023          Diagnoses and all orders for this visit:    1. Encounter for health maintenance examination in adult (Primary)  -     CBC & Differential  -     TSH  -     Comprehensive Metabolic Panel  -     Lipid Panel    2. Encounter for cervical Pap smear with pelvic exam  -     LIQUID-BASED PAP SMEAR, [facility] ([facility])    3. Attention or concentration deficit   Stable, no medication.    4. Gastroesophageal reflux disease, unspecified whether esophagitis present   Continue current medication(s) as noted in the history of present illness.    5. Osteopenia of multiple sites  -     DEXA Bone Density Axial; Future  -     Vitamin D 25 Hydroxy   Continue Calcium and Vitamin D supplementation, as well as weight bearing exercises.    6. Vitamin D deficiency  -     Vitamin D 25 Hydroxy   Continue Vitamin D  supplementation.    7. Left upper quadrant abdominal pain  -     CT Abdomen Pelvis With Contrast; Future    8. Flank pain  -     POC Urinalysis Dipstick, Automated  -     CT Abdomen Pelvis With Contrast; Future    9. Primary insomnia  -     traZODone (DESYREL) 50 MG tablet; Take 1-2 tablets by mouth At Night As Needed for Sleep.  Dispense: 60 tablet; Refill: 11- REFILL    10. Other depression  -     escitalopram (LEXAPRO) 10 MG tablet; Take 1.5 tablets by mouth Daily.  Dispense: 30 tablet; Refill: 11- INCREASED DOSE    11. Generalized anxiety disorder  -     escitalopram (LEXAPRO) 10 MG tablet; Take 1.5 tablets by mouth Daily.  Dispense: 30 tablet; Refill: 11- INCREASED DOSE    12. Screening for cardiovascular condition  -     CBC & Differential  -     TSH  -     Comprehensive Metabolic Panel  -     Lipid Panel    13. High risk medication use  -     Magnesium    14. Encounter for screening mammogram for breast cancer  -     Mammo Screening Digital Tomosynthesis Bilateral With CAD; Future    15. Need for COVID-19 vaccine  -     COVID-19 Vaccine (Pfizer) Blue Cap      I recommended Shingrix (new Shingles vaccine) vaccination at the pharmacy.    I recommended [facility].   Information was given today.    The patient agrees to call if she would like to have a Cardiac CT Scan scheduled (information given today).      BMI is >= 25 and <30. (Overweight) The following options were offered after discussion;: exercise counseling/recommendations and nutrition counseling/recommendations        Return in about 1 year (around 7/8/2023) for Annual physical, fasting.

## 2022-07-08 NOTE — PATIENT INSTRUCTIONS
I recommend Shingrix (new Shingles vaccine) vaccination at the pharmacy.    Recommend [facility].   Information was given today.    Please call if you would like to have a Cardiac CT Scan scheduled (information given today).    Health Maintenance for Postmenopausal Women  Menopause is a normal process in which your ability to get pregnant comes to an end. This process happens slowly over many months or years, usually between the ages of 48 and 55. Menopause is complete when you have missed your menstrual periods for 12 months.  It is important to talk with your health care provider about some of the most common conditions that affect women after menopause (postmenopausal women). These include heart disease, cancer, and bone loss (osteoporosis). Adopting a healthy lifestyle and getting preventive care can help to promote your health and wellness. The actions you take can also lower your chances of developing some of these common conditions.  What should I know about menopause?  During menopause, you may get a number of symptoms, such as:  Hot flashes. These can be moderate or severe.  Night sweats.  Decrease in sex drive.  Mood swings.  Headaches.  Tiredness.  Irritability.  Memory problems.  Insomnia.  Choosing to treat or not to treat these symptoms is a decision that you make with your health care provider.  Do I need hormone replacement therapy?  Hormone replacement therapy is effective in treating symptoms that are caused by menopause, such as hot flashes and night sweats.  Hormone replacement carries certain risks, especially as you become older. If you are thinking about using estrogen or estrogen with progestin, discuss the benefits and risks with your health care provider.  What is my risk for heart disease and stroke?  The risk of heart disease, heart attack, and stroke increases as you age. One of the causes may be a change in the body's hormones during menopause. This can affect  how your body uses dietary fats, triglycerides, and cholesterol. Heart attack and stroke are medical emergencies. There are many things that you can do to help prevent heart disease and stroke.  Watch your blood pressure  High blood pressure causes heart disease and increases the risk of stroke. This is more likely to develop in people who have high blood pressure readings, are of  descent, or are overweight.  Have your blood pressure checked:  Every 3-5 years if you are 18-39 years of age.  Every year if you are 40 years old or older.  Eat a healthy diet    Eat a diet that includes plenty of vegetables, fruits, low-fat dairy products, and lean protein.  Do not eat a lot of foods that are high in solid fats, added sugars, or sodium.    Get regular exercise  Get regular exercise. This is one of the most important things you can do for your health. Most adults should:  Try to exercise for at least 150 minutes each week. The exercise should increase your heart rate and make you sweat (moderate-intensity exercise).  Try to do strengthening exercises at least twice each week. Do these in addition to the moderate-intensity exercise.  Spend less time sitting. Even light physical activity can be beneficial.  Other tips  Work with your health care provider to achieve or maintain a healthy weight.  Do not use any products that contain nicotine or tobacco, such as cigarettes, e-cigarettes, and chewing tobacco. If you need help quitting, ask your health care provider.  Know your numbers. Ask your health care provider to check your cholesterol and your blood sugar (glucose). Continue to have your blood tested as directed by your health care provider.  Do I need screening for cancer?  Depending on your health history and family history, you may need to have cancer screening at different stages of your life. This may include screening for:  Breast cancer.  Cervical cancer.  Lung cancer.  Colorectal cancer.  What is my risk  for osteoporosis?  After menopause, you may be at increased risk for osteoporosis. Osteoporosis is a condition in which bone destruction happens more quickly than new bone creation. To help prevent osteoporosis or the bone fractures that can happen because of osteoporosis, you may take the following actions:  If you are 19-50 years old, get at least 1,000 mg of calcium and at least 600 mg of vitamin D per day.  If you are older than age 50 but younger than age 70, get at least 1,200 mg of calcium and at least 600 mg of vitamin D per day.  If you are older than age 70, get at least 1,200 mg of calcium and at least 800 mg of vitamin D per day.  Smoking and drinking excessive alcohol increase the risk of osteoporosis. Eat foods that are rich in calcium and vitamin D, and do weight-bearing exercises several times each week as directed by your health care provider.  How does menopause affect my mental health?  Depression may occur at any age, but it is more common as you become older. Common symptoms of depression include:  Low or sad mood.  Changes in sleep patterns.  Changes in appetite or eating patterns.  Feeling an overall lack of motivation or enjoyment of activities that you previously enjoyed.  Frequent crying spells.  Talk with your health care provider if you think that you are experiencing depression.  General instructions  See your health care provider for regular wellness exams and vaccines. This may include:  Scheduling regular health, dental, and eye exams.  Getting and maintaining your vaccines. These include:  Influenza vaccine. Get this vaccine each year before the flu season begins.  Pneumonia vaccine.  Shingles vaccine.  Tetanus, diphtheria, and pertussis (Tdap) booster vaccine.  Your health care provider may also recommend other immunizations.  Tell your health care provider if you have ever been abused or do not feel safe at home.  Summary  Menopause is a normal process in which your ability to get  pregnant comes to an end.  This condition causes hot flashes, night sweats, decreased interest in sex, mood swings, headaches, or lack of sleep.  Treatment for this condition may include hormone replacement therapy.  Take actions to keep yourself healthy, including exercising regularly, eating a healthy diet, watching your weight, and checking your blood pressure and blood sugar levels.  Get screened for cancer and depression. Make sure that you are up to date with all your vaccines.  This information is not intended to replace advice given to you by your health care provider. Make sure you discuss any questions you have with your health care provider.  Document Revised: 12/11/2019 Document Reviewed: 12/11/2019  [employer] Patient Education © 2021 [employer]  MyPlate from [employer]    MyPlate is an outline of a general healthy diet based on the 2010 Dietary Guidelines for Americans, from the U.S. Department of Agriculture (USDA). It sets guidelines for how To follow MyPlate recommendations:  Eat a wide variety of fruits and vegetables, grains, and protein foods.  Serve smaller portions and eat less food throughout the day.  Limit portion sizes to avoid overeating.  Enjoy your food.  Get at least 150 minutes of exercise every week. This is about 30 minutes each day, 5 or more days per week.  It can be difficult to have every meal look like MyPlate. Think about MyPlate as eating guidelines for an entire day, rather than each individual meal.  Fruits and vegetables  Make half of your plate fruits and vegetables.  Eat many different colors of fruits and vegetables each day.  For a 2,000 calorie daily food plan, eat:  2½ cups of vegetables every day.  2 cups of fruit every day.  1 cup is equal to:  1 cup raw or cooked vegetables.  1 cup raw fruit.  1 medium-sized orange, apple, or banana.  1 cup 100% fruit or vegetable juice.  2 cups raw leafy greens, such as lettuce, spinach, or kale.  ½ cup dried fruit.  Grains  One fourth  of your plate should be grains.  Make at least half of the grains you eat each day whole grains.  For a 2,000 calorie daily food plan, eat 6 oz of grains every day.  1 oz is equal to:  1 slice bread.  1 cup cereal.  ½ cup cooked rice, cereal, or pasta.  Protein  One fourth of your plate should be protein.  Eat a wide variety of protein foods, including meat, poultry, fish, eggs, beans, nuts, and tofu.  For a 2,000 calorie daily food plan, eat 5½ oz of protein every day.  1 oz is equal to:  1 oz meat, poultry, or fish.  ¼ cup cooked beans.  1 egg.  ½ oz nuts or seeds.  1 Tbsp peanut butter.  Dairy  Drink fat-free or low-fat (1%) milk.  Eat or drink dairy as a side to meals.  For a 2,000 calorie daily food plan, eat or drink 3 cups of dairy every day.  1 cup is equal to:  1 cup milk, yogurt, cottage cheese, or soy milk (soy beverage).  2 oz processed cheese.  1½ oz natural cheese.  Fats, oils, salt, and sugars  Only small amounts of oils are recommended.  Avoid foods that are high in calories and low in nutritional value (empty calories), like foods high in fat or added sugars.  Choose foods that are low in salt (sodium). Choose foods that have less than 140 milligrams (mg) of sodium per serving.  Drink water instead of sugary drinks. Drink enough water each day to keep your urine pale yellow.  Where to find support  Work with your health care provider or a nutrition specialist (dietitian) to develop a customized eating plan that is right for you.  Download an freida (mobile application) to help you track your daily food intake.  Where to find more information  Go to ChooseMyPlate.gov for more information.  Summary  MyPlate is a general guideline for healthy eating from the USDA. It is based on the 2010 Dietary Guidelines for Americans.  In general, fruits and vegetables should take up ½ of your plate, grains should take up ¼ of your plate, and protein should take up ¼ of your plate.  This information is not intended to  replace advice given to you by your health care provider. Make sure you discuss any questions you have with your health care provider.  Document Revised: 05/21/2020 Document Reviewed: 03/19/2018  [employer] Patient Education © 2021 [employer]  Calorie Counting for Weight Loss  Calories are units of energy. Your body needs a certain number of calories from food to keep going throughout the day. When you eat or drink more calories than your body needs, your body stores the extra calories mostly as fat. When you eat or drink fewer calories than your body needs, your body burns fat to get the energy it needs.  Calorie counting means keeping track of how many calories you eat and drink each day. Calorie counting can be helpful if you need to lose weight. If you eat fewer calories than your body needs, you should lose weight. Ask your health care provider what a healthy weight is for you.  For calorie counting to work, you will need to eat the right number of calories each day to lose a healthy amount of weight per week. A dietitian can help you figure out how many calories you need in a day and will suggest ways to reach your calorie goal.  A healthy amount of weight to lose each week is usually 1-2 lb (0.5-0.9 kg). This usually means that your daily calorie intake should be reduced by 500-750 calories.  Eating 1,200-1,500 calories a day can help most women lose weight.  Eating 1,500-1,800 calories a day can help most men lose weight.  What do I need to know about calorie counting?  Work with your health care provider or dietitian to determine how many calories you should get each day. To meet your daily calorie goal, you will need to:  Find out how many calories are in each food that you would like to eat. Try to do this before you eat.  Decide how much of the food you plan to eat.  Keep a food log. Do this by writing down what you ate and how many calories it had.  To successfully lose weight, it is important to  balance calorie counting with a healthy lifestyle that includes regular activity.  Where do I find calorie information?    The number of calories in a food can be found on a Nutrition Facts label. If a food does not have a Nutrition Facts label, try to look up the calories online or ask your dietitian for help.  Remember that calories are listed per serving. If you choose to have more than one serving of a food, you will have to multiply the calories per serving by the number of servings you plan to eat. For example, the label on a package of bread might say that a serving size is 1 slice and that there are 90 calories in a serving. If you eat 1 slice, you will have eaten 90 calories. If you eat 2 slices, you will have eaten 180 calories.  How do I keep a food log?  After each time that you eat, record the following in your food log as soon as possible:  What you ate. Be sure to include toppings, sauces, and other extras on the food.  How much you ate. This can be measured in cups, ounces, or number of items.  How many calories were in each food and drink.  The total number of calories in the food you ate.  Keep your food log near you, such as in a pocket-sized notebook or on an freida or website on your mobile phone. Some programs will calculate calories for you and show you how many calories you have left to meet your daily goal.  What are some portion-control tips?  Know how many calories are in a serving. This will help you know how many servings you can have of a certain food.  Use a measuring cup to measure serving sizes. You could also try weighing out portions on a kitchen scale. With time, you will be able to estimate serving sizes for some foods.  Take time to put servings of different foods on your favorite plates or in your favorite bowls and cups so you know what a serving looks like.  Try not to eat straight from a food's packaging, such as from a bag or box. Eating straight from the package makes it hard  to see how much you are eating and can lead to overeating. Put the amount you would like to eat in a cup or on a plate to make sure you are eating the right portion.  Use smaller plates, glasses, and bowls for smaller portions and to prevent overeating.  Try not to multitask. For example, avoid watching TV or using your computer while eating. If it is time to eat, sit down at a table and enjoy your food. This will help you recognize when you are full. It will also help you be more mindful of what and how much you are eating.  What are tips for following this plan?  Reading food labels  Check the calorie count compared with the serving size. The serving size may be smaller than what you are used to eating.  Check the source of the calories. Try to choose foods that are high in protein, fiber, and vitamins, and low in saturated fat, trans fat, and sodium.  Shopping  Read nutrition labels while you shop. This will help you make healthy decisions about which foods to buy.  Pay attention to nutrition labels for low-fat or fat-free foods. These foods sometimes have the same number of calories or more calories than the full-fat versions. They also often have added sugar, starch, or salt to make up for flavor that was removed with the fat.  Make a grocery list of lower-calorie foods and stick to it.  Cooking  Try to cook your favorite foods in a healthier way. For example, try baking instead of frying.  Use low-fat dairy products.  Meal planning  Use more fruits and vegetables. One-half of your plate should be fruits and vegetables.  Include lean proteins, such as chicken, turkey, and fish.  Lifestyle  Each week, aim to do one of the followin minutes of moderate exercise, such as walking.  75 minutes of vigorous exercise, such as running.  General information  Know how many calories are in the foods you eat most often. This will help you calculate calorie counts faster.  Find a way of tracking calories that works for  you. Get creative. Try different apps or programs if writing down calories does not work for you.  What foods should I eat?    Eat nutritious foods. It is better to have a nutritious, high-calorie food, such as an avocado, than a food with few nutrients, such as a bag of potato chips.  Use your calories on foods and drinks that will fill you up and will not leave you hungry soon after eating.  Examples of foods that fill you up are nuts and nut butters, vegetables, lean proteins, and high-fiber foods such as whole grains. High-fiber foods are foods with more than 5 g of fiber per serving.  Pay attention to calories in drinks. Low-calorie drinks include water and unsweetened drinks.  The items listed above may not be a complete list of foods and beverages you can eat. Contact a dietitian for more information.  What foods should I limit?  Limit foods or drinks that are not good sources of vitamins, minerals, or protein or that are high in unhealthy fats. These include:  Candy.  Other sweets.  Sodas, specialty coffee drinks, alcohol, and juice.  The items listed above may not be a complete list of foods and beverages you should avoid. Contact a dietitian for more information.  How do I count calories when eating out?  Pay attention to portions. Often, portions are much larger when eating out. Try these tips to keep portions smaller:  Consider sharing a meal instead of getting your own.  If you get your own meal, eat only half of it. Before you start eating, ask for a container and put half of your meal into it.  When available, consider ordering smaller portions from the menu instead of full portions.  Pay attention to your food and drink choices. Knowing the way food is cooked and what is included with the meal can help you eat fewer calories.  If calories are listed on the menu, choose the lower-calorie options.  Choose dishes that include vegetables, fruits, whole grains, low-fat dairy products, and lean  proteins.  Choose items that are boiled, broiled, grilled, or steamed. Avoid items that are buttered, battered, fried, or served with cream sauce. Items labeled as crispy are usually fried, unless stated otherwise.  Choose water, low-fat milk, unsweetened iced tea, or other drinks without added sugar. If you want an alcoholic beverage, choose a lower-calorie option, such as a glass of wine or light beer.  Ask for dressings, sauces, and syrups on the side. These are usually high in calories, so you should limit the amount you eat.  If you want a salad, choose a garden salad and ask for grilled meats. Avoid extra toppings such as brizuela, cheese, or fried items. Ask for the dressing on the side, or ask for olive oil and vinegar or lemon to use as dressing.  Estimate how many servings of a food you are given. Knowing serving sizes will help you be aware of how much food you are eating at restaurants.  Where to find more information  Centers for Disease Control and Prevention: www.cdc.gov  U.S. Department of Agriculture: myplate.gov  Summary  Calorie counting means keeping track of how many calories you eat and drink each day. If you eat fewer calories than your body needs, you should lose weight.  A healthy amount of weight to lose per week is usually 1-2 lb (0.5-0.9 kg). This usually means reducing your daily calorie intake by 500-750 calories.  The number of calories in a food can be found on a Nutrition Facts label. If a food does not have a Nutrition Facts label, try to look up the calories online or ask your dietitian for help.  Use smaller plates, glasses, and bowls for smaller portions and to prevent overeating.  Use your calories on foods and drinks that will fill you up and not leave you hungry shortly after a meal.  This information is not intended to replace advice given to you by your health care provider. Make sure you discuss any questions you have with your health care provider.  Document Revised:  01/28/2021 Document Reviewed: 01/28/2021  [employer] Patient Education © 2021 [employer]  Exercising to Stay Healthy  To become healthy and stay healthy, it is recommended that you do moderate-intensity and vigorous-intensity exercise. You can tell that you are exercising at a moderate intensity if your heart starts beating faster and you start breathing faster but can still hold a conversation. You can tell that you are exercising at a vigorous intensity if you are breathing much harder and faster and cannot hold a conversation while exercising.  Exercising regularly is important. It has many health benefits, such as:  Improving overall fitness, flexibility, and endurance.  Increasing bone density.  Helping with weight control.  Decreasing body fat.  Increasing muscle strength.  Reducing stress and tension.  Improving overall health.  How often should I exercise?  Choose an activity that you enjoy, and set realistic goals. Your health care provider can help you make an activity plan that works for you.  Exercise regularly as told by your health care provider. This may include:  Doing strength training two times a week, such as:  Lifting weights.  Using resistance bands.  Push-ups.  Sit-ups.  Yoga.  Doing a certain intensity of exercise for a given amount of time. Choose from these options:  A total of 150 minutes of moderate-intensity exercise every week.  A total of 75 minutes of vigorous-intensity exercise every week.  A mix of moderate-intensity and vigorous-intensity exercise every week.  Children, pregnant women, people who have not exercised regularly, people who are overweight, and older adults may need to talk with a health care provider about what activities are safe to do. If you have a medical condition, be sure to talk with your health care provider before you start a new exercise program.  What are some exercise ideas?  Moderate-intensity exercise ideas include:  Walking 1 mile (1.6 km) in about 15  minutes.  Biking.  Hiking.  Golfing.  Dancing.  Water aerobics.  Vigorous-intensity exercise ideas include:  Walking 4.5 miles (7.2 km) or more in about 1 hour.  Jogging or running 5 miles (8 km) in about 1 hour.  Biking 10 miles (16.1 km) or more in about 1 hour.  Lap swimming.  Roller-skating or in-line skating.  Cross-country skiing.  Vigorous competitive sports, such as football, basketball, and soccer.  Jumping rope.  Aerobic dancing.  What are some everyday activities that can help me to get exercise?  Yard work, such as:  Pushing a .  Raking and bagging leaves.  Washing your car.  Pushing a stroller.  Shoveling snow.  Gardening.  Washing windows or floors.  How can I be more active in my day-to-day activities?  Use stairs instead of an elevator.  Take a walk during your lunch break.  If you drive, park your car farther away from your work or school.  If you take public transportation, get off one stop early and walk the rest of the way.  Stand up or walk around during all of your indoor phone calls.  Get up, stretch, and walk around every 30 minutes throughout the day.  Enjoy exercise with a friend. Support to continue exercising will help you keep a regular routine of activity.  What guidelines can I follow while exercising?  Before you start a new exercise program, talk with your health care provider.  Do not exercise so much that you hurt yourself, feel dizzy, or get very short of breath.  Wear comfortable clothes and wear shoes with good support.  Drink plenty of water while you exercise to prevent dehydration or heat stroke.  Work out until your breathing and your heartbeat get faster.  Where to find more information  U.S. Department of Health and Human Services: www.hhs.gov  Centers for Disease Control and Prevention (CDC): www.cdc.gov  Summary  Exercising regularly is important. It will improve your overall fitness, flexibility, and endurance.  Regular exercise also will improve your overall  health. It can help you control your weight, reduce stress, and improve your bone density.  Do not exercise so much that you hurt yourself, feel dizzy, or get very short of breath.  Before you start a new exercise program, talk with your health care provider.  This information is not intended to replace advice given to you by your health care provider. Make sure you discuss any questions you have with your health care provider.  Document Revised: 11/30/2018 Document Reviewed: 11/08/2018  [employer] Patient Education © 2021 [employer]

## 2022-07-09 LAB — TSH SERPL DL<=0.05 MIU/L-ACNC: 1.32 UIU/ML (ref 0.27–4.2)

## 2022-07-14 LAB — REF LAB TEST METHOD: NORMAL

## 2022-07-19 RX ORDER — ESCITALOPRAM OXALATE 10 MG/1
15 TABLET ORAL DAILY
Qty: 30 TABLET | Refills: 11
Start: 2022-07-19 | End: 2023-01-24

## 2022-08-09 ENCOUNTER — TELEPHONE (OUTPATIENT)
Dept: INTERNAL MEDICINE | Facility: CLINIC | Age: 62
End: 2022-08-09

## 2022-08-09 NOTE — TELEPHONE ENCOUNTER
Jayleen Schulte Sibel S, MD  Closing referral as the internal dept has reached out 3x and I mailed a letter 2 weeks ago-please let me know if that is not ok     Ok to cancel order?     Call patient please.  We have been unable to contact her to schedule her CT abdomen.  Is she still having left upper quadrant abdominal pain and left flank pain?

## 2022-08-09 NOTE — TELEPHONE ENCOUNTER
SALEEM please read to patient Tried reaching Jamee  Left detailed message to call back and let us know if she is still having LUQ pain  Or flank pain and does she want the CT Abdomen scheduled   Office # given

## 2022-08-12 ENCOUNTER — TRANSCRIBE ORDERS (OUTPATIENT)
Dept: ADMINISTRATIVE | Facility: HOSPITAL | Age: 62
End: 2022-08-12

## 2022-08-12 DIAGNOSIS — Z12.31 VISIT FOR SCREENING MAMMOGRAM: Primary | ICD-10-CM

## 2022-08-17 ENCOUNTER — HOSPITAL ENCOUNTER (OUTPATIENT)
Dept: CT IMAGING | Facility: HOSPITAL | Age: 62
Discharge: HOME | End: 2022-08-17

## 2022-08-17 ENCOUNTER — HOSPITAL ENCOUNTER (OUTPATIENT)
Dept: MAMMOGRAPHY | Facility: HOSPITAL | Age: 62
Discharge: HOME | End: 2022-08-17

## 2022-08-17 DIAGNOSIS — R10.9 FLANK PAIN: ICD-10-CM

## 2022-08-17 DIAGNOSIS — Z12.31 VISIT FOR SCREENING MAMMOGRAM: ICD-10-CM

## 2022-08-17 DIAGNOSIS — R10.12 LEFT UPPER QUADRANT ABDOMINAL PAIN: ICD-10-CM

## 2022-08-17 PROCEDURE — 25010000002 IOPAMIDOL 61 % SOLUTION: Performed by: INTERNAL MEDICINE

## 2022-08-17 PROCEDURE — 77067 SCR MAMMO BI INCL CAD: CPT

## 2022-08-17 PROCEDURE — 74177 CT ABD & PELVIS W/CONTRAST: CPT

## 2022-08-17 PROCEDURE — 77067 SCR MAMMO BI INCL CAD: CPT | Performed by: RADIOLOGY

## 2022-08-17 PROCEDURE — 77063 BREAST TOMOSYNTHESIS BI: CPT | Performed by: RADIOLOGY

## 2022-08-17 PROCEDURE — 77063 BREAST TOMOSYNTHESIS BI: CPT

## 2022-08-17 RX ADMIN — IOPAMIDOL 95 ML: 612 INJECTION, SOLUTION INTRAVENOUS at 11:04

## 2022-08-22 ENCOUNTER — APPOINTMENT (OUTPATIENT)
Dept: MAMMOGRAPHY | Facility: HOSPITAL | Age: 62
End: 2022-08-22

## 2023-01-23 DIAGNOSIS — F41.1 GENERALIZED ANXIETY DISORDER: ICD-10-CM

## 2023-01-23 DIAGNOSIS — F32.89 OTHER DEPRESSION: ICD-10-CM

## 2023-01-24 RX ORDER — ESCITALOPRAM OXALATE 10 MG/1
TABLET ORAL
Qty: 30 TABLET | Refills: 5 | Status: SHIPPED | OUTPATIENT
Start: 2023-01-24

## 2023-01-24 NOTE — TELEPHONE ENCOUNTER
Rx Refill Note  Requested Prescriptions     Pending Prescriptions Disp Refills   • escitalopram (LEXAPRO) 10 MG tablet [Pharmacy Med Name: ESCITALOPRAM 10 MG TABLET] 30 tablet 11     Sig: TAKE ONE TABLET BY MOUTH DAILY      Last office visit with prescribing clinician: 7/8/2022   Last telemedicine visit with prescribing clinician: Visit date not found   Next office visit with prescribing clinician: Visit date not found                         Would you like a call back once the refill request has been completed: [] Yes [] No    If the office needs to give you a call back, can they leave a voicemail: [] Yes [] No    Cleo Han MA  01/24/23, 07:52 EST

## 2023-07-29 DIAGNOSIS — F41.1 GENERALIZED ANXIETY DISORDER: ICD-10-CM

## 2023-07-29 DIAGNOSIS — F32.89 OTHER DEPRESSION: ICD-10-CM

## 2023-07-31 RX ORDER — ESCITALOPRAM OXALATE 10 MG/1
TABLET ORAL
Qty: 30 TABLET | Refills: 1 | Status: SHIPPED | OUTPATIENT
Start: 2023-07-31

## 2023-08-15 DIAGNOSIS — F51.01 PRIMARY INSOMNIA: ICD-10-CM

## 2023-08-16 RX ORDER — TRAZODONE HYDROCHLORIDE 50 MG/1
TABLET ORAL
Qty: 180 TABLET | Refills: 0 | Status: SHIPPED | OUTPATIENT
Start: 2023-08-16

## 2023-08-16 NOTE — TELEPHONE ENCOUNTER
Prescription has been sent to the pharmacy, but she is past due for her yearly physical, fasting.  Please schedule.

## 2023-08-17 NOTE — TELEPHONE ENCOUNTER
Called patient and left .    MAE OKAY TO READ:   Prescription has been sent to the pharmacy, but she is past due for her yearly physical, fasting.  Please schedule.

## 2023-08-21 NOTE — TELEPHONE ENCOUNTER
Left message for Jamee to call and schedule physical which was past due 7/2023  Office # given   HUBB please schedule

## 2025-07-30 NOTE — TELEPHONE ENCOUNTER
End of Shift Note: IRP    What matters most to the patient this shift: getting some good sleep now that I'm not above the MRI machine    Significant Events: moved rooms d/t noise complaint    Pain Management: Last Pain Score: Numeric Rating Scale 0-10: 0 (07/29/25 1000)     Diet: Room Service Continuous  Consistent Carb Moderate (45-75 Gm/Meal), Cardiac; Yes, Medical Nutrition Management by Rd (Registered Dietitian); Patient May Have Double/Larger Portions of Meat/Eggs Diet      Bowel Function:  Normal  LBM:  Unmeasured Stool Occurrence: 1 (07/29/25 1657)   Activity:  Activity: Dangled (07/29/25 1804)  Mobility Assistive Device:  Mobility Assistive Device: Gait belt;Walker (07/29/25 1804)  Level of Assistance:  Level of Assistance: Minimal assist (07/29/25 1804)  Positioning: Positioning: Semi Fowlers (30-45 degrees) (07/29/25 1629)  LDAs: PICC    Patient's Anticipated Discharge Needs: Anticipated discharge needs eval completed (07/29/25 1222)       noted